# Patient Record
Sex: MALE | Race: WHITE | NOT HISPANIC OR LATINO | URBAN - METROPOLITAN AREA
[De-identification: names, ages, dates, MRNs, and addresses within clinical notes are randomized per-mention and may not be internally consistent; named-entity substitution may affect disease eponyms.]

---

## 2018-03-30 ENCOUNTER — EMERGENCY (EMERGENCY)
Facility: HOSPITAL | Age: 58
LOS: 1 days | Discharge: HOME | End: 2018-03-30
Attending: EMERGENCY MEDICINE | Admitting: EMERGENCY MEDICINE
Payer: COMMERCIAL

## 2018-03-30 VITALS
HEIGHT: 70 IN | HEART RATE: 78 BPM | OXYGEN SATURATION: 95 % | DIASTOLIC BLOOD PRESSURE: 82 MMHG | SYSTOLIC BLOOD PRESSURE: 130 MMHG | WEIGHT: 160.06 LBS | TEMPERATURE: 98 F | RESPIRATION RATE: 14 BRPM

## 2018-03-30 VITALS
OXYGEN SATURATION: 99 % | RESPIRATION RATE: 16 BRPM | TEMPERATURE: 98 F | DIASTOLIC BLOOD PRESSURE: 93 MMHG | HEART RATE: 74 BPM | SYSTOLIC BLOOD PRESSURE: 144 MMHG

## 2018-03-30 LAB
ALBUMIN SERPL ELPH-MCNC: 3.6 G/DL — SIGNIFICANT CHANGE UP (ref 3.3–5)
ALP SERPL-CCNC: 85 U/L — SIGNIFICANT CHANGE UP (ref 30–120)
ALT FLD-CCNC: 26 U/L DA — SIGNIFICANT CHANGE UP (ref 10–60)
AMPHET UR-MCNC: NEGATIVE — SIGNIFICANT CHANGE UP
ANION GAP SERPL CALC-SCNC: 8 MMOL/L — SIGNIFICANT CHANGE UP (ref 5–17)
APPEARANCE UR: CLEAR — SIGNIFICANT CHANGE UP
APTT BLD: 35.7 SEC — SIGNIFICANT CHANGE UP (ref 27.5–37.4)
AST SERPL-CCNC: 29 U/L — SIGNIFICANT CHANGE UP (ref 10–40)
BARBITURATES UR SCN-MCNC: NEGATIVE — SIGNIFICANT CHANGE UP
BASOPHILS # BLD AUTO: 0.1 K/UL — SIGNIFICANT CHANGE UP (ref 0–0.2)
BASOPHILS NFR BLD AUTO: 1.2 % — SIGNIFICANT CHANGE UP (ref 0–2)
BENZODIAZ UR-MCNC: NEGATIVE — SIGNIFICANT CHANGE UP
BILIRUB SERPL-MCNC: 0.7 MG/DL — SIGNIFICANT CHANGE UP (ref 0.2–1.2)
BILIRUB UR-MCNC: NEGATIVE — SIGNIFICANT CHANGE UP
BUN SERPL-MCNC: 14 MG/DL — SIGNIFICANT CHANGE UP (ref 7–23)
CALCIUM SERPL-MCNC: 9.9 MG/DL — SIGNIFICANT CHANGE UP (ref 8.4–10.5)
CHLORIDE SERPL-SCNC: 100 MMOL/L — SIGNIFICANT CHANGE UP (ref 96–108)
CK MB BLD-MCNC: 0.7 % — SIGNIFICANT CHANGE UP (ref 0–3.5)
CK MB CFR SERPL CALC: 1.5 NG/ML — SIGNIFICANT CHANGE UP (ref 0–3.6)
CK SERPL-CCNC: 215 U/L — SIGNIFICANT CHANGE UP (ref 39–308)
CO2 SERPL-SCNC: 30 MMOL/L — SIGNIFICANT CHANGE UP (ref 22–31)
COCAINE METAB.OTHER UR-MCNC: NEGATIVE — SIGNIFICANT CHANGE UP
COLOR SPEC: YELLOW — SIGNIFICANT CHANGE UP
CREAT SERPL-MCNC: 1.04 MG/DL — SIGNIFICANT CHANGE UP (ref 0.5–1.3)
DIFF PNL FLD: NEGATIVE — SIGNIFICANT CHANGE UP
EOSINOPHIL # BLD AUTO: 0.4 K/UL — SIGNIFICANT CHANGE UP (ref 0–0.5)
EOSINOPHIL NFR BLD AUTO: 5.7 % — SIGNIFICANT CHANGE UP (ref 0–6)
GLUCOSE SERPL-MCNC: 96 MG/DL — SIGNIFICANT CHANGE UP (ref 70–99)
GLUCOSE UR QL: NEGATIVE MG/DL — SIGNIFICANT CHANGE UP
HCT VFR BLD CALC: 40.2 % — SIGNIFICANT CHANGE UP (ref 39–50)
HGB BLD-MCNC: 13.8 G/DL — SIGNIFICANT CHANGE UP (ref 13–17)
INR BLD: 1.07 RATIO — SIGNIFICANT CHANGE UP (ref 0.88–1.16)
KETONES UR-MCNC: ABNORMAL
LEUKOCYTE ESTERASE UR-ACNC: NEGATIVE — SIGNIFICANT CHANGE UP
LYMPHOCYTES # BLD AUTO: 1.4 K/UL — SIGNIFICANT CHANGE UP (ref 1–3.3)
LYMPHOCYTES # BLD AUTO: 20.8 % — SIGNIFICANT CHANGE UP (ref 13–44)
MCHC RBC-ENTMCNC: 30.4 PG — SIGNIFICANT CHANGE UP (ref 27–34)
MCHC RBC-ENTMCNC: 34.2 GM/DL — SIGNIFICANT CHANGE UP (ref 32–36)
MCV RBC AUTO: 88.8 FL — SIGNIFICANT CHANGE UP (ref 80–100)
METHADONE UR-MCNC: NEGATIVE — SIGNIFICANT CHANGE UP
MONOCYTES # BLD AUTO: 0.8 K/UL — SIGNIFICANT CHANGE UP (ref 0–0.9)
MONOCYTES NFR BLD AUTO: 12.8 % — SIGNIFICANT CHANGE UP (ref 2–14)
NEUTROPHILS # BLD AUTO: 3.9 K/UL — SIGNIFICANT CHANGE UP (ref 1.8–7.4)
NEUTROPHILS NFR BLD AUTO: 59.5 % — SIGNIFICANT CHANGE UP (ref 43–77)
NITRITE UR-MCNC: NEGATIVE — SIGNIFICANT CHANGE UP
OPIATES UR-MCNC: POSITIVE — SIGNIFICANT CHANGE UP
PCP SPEC-MCNC: SIGNIFICANT CHANGE UP
PCP UR-MCNC: NEGATIVE — SIGNIFICANT CHANGE UP
PH UR: 6 — SIGNIFICANT CHANGE UP (ref 5–8)
PLATELET # BLD AUTO: 186 K/UL — SIGNIFICANT CHANGE UP (ref 150–400)
POTASSIUM SERPL-MCNC: 3.9 MMOL/L — SIGNIFICANT CHANGE UP (ref 3.5–5.3)
POTASSIUM SERPL-SCNC: 3.9 MMOL/L — SIGNIFICANT CHANGE UP (ref 3.5–5.3)
PROT SERPL-MCNC: 7.3 G/DL — SIGNIFICANT CHANGE UP (ref 6–8.3)
PROT UR-MCNC: NEGATIVE MG/DL — SIGNIFICANT CHANGE UP
PROTHROM AB SERPL-ACNC: 11.7 SEC — SIGNIFICANT CHANGE UP (ref 9.8–12.7)
RBC # BLD: 4.53 M/UL — SIGNIFICANT CHANGE UP (ref 4.2–5.8)
RBC # FLD: 10.9 % — SIGNIFICANT CHANGE UP (ref 10.3–14.5)
SODIUM SERPL-SCNC: 138 MMOL/L — SIGNIFICANT CHANGE UP (ref 135–145)
SP GR SPEC: 1.01 — SIGNIFICANT CHANGE UP (ref 1.01–1.02)
THC UR QL: NEGATIVE — SIGNIFICANT CHANGE UP
TROPONIN I SERPL-MCNC: 0 NG/ML — LOW (ref 0.02–0.06)
UROBILINOGEN FLD QL: NEGATIVE MG/DL — SIGNIFICANT CHANGE UP
WBC # BLD: 6.6 K/UL — SIGNIFICANT CHANGE UP (ref 3.8–10.5)
WBC # FLD AUTO: 6.6 K/UL — SIGNIFICANT CHANGE UP (ref 3.8–10.5)

## 2018-03-30 PROCEDURE — 70450 CT HEAD/BRAIN W/O DYE: CPT | Mod: 26

## 2018-03-30 PROCEDURE — 82550 ASSAY OF CK (CPK): CPT

## 2018-03-30 PROCEDURE — 93005 ELECTROCARDIOGRAM TRACING: CPT

## 2018-03-30 PROCEDURE — 93010 ELECTROCARDIOGRAM REPORT: CPT

## 2018-03-30 PROCEDURE — 71045 X-RAY EXAM CHEST 1 VIEW: CPT

## 2018-03-30 PROCEDURE — 84484 ASSAY OF TROPONIN QUANT: CPT

## 2018-03-30 PROCEDURE — 36415 COLL VENOUS BLD VENIPUNCTURE: CPT

## 2018-03-30 PROCEDURE — 85027 COMPLETE CBC AUTOMATED: CPT

## 2018-03-30 PROCEDURE — 85610 PROTHROMBIN TIME: CPT

## 2018-03-30 PROCEDURE — 82962 GLUCOSE BLOOD TEST: CPT

## 2018-03-30 PROCEDURE — 70450 CT HEAD/BRAIN W/O DYE: CPT

## 2018-03-30 PROCEDURE — 80307 DRUG TEST PRSMV CHEM ANLYZR: CPT

## 2018-03-30 PROCEDURE — 71045 X-RAY EXAM CHEST 1 VIEW: CPT | Mod: 26

## 2018-03-30 PROCEDURE — 81003 URINALYSIS AUTO W/O SCOPE: CPT

## 2018-03-30 PROCEDURE — 82553 CREATINE MB FRACTION: CPT

## 2018-03-30 PROCEDURE — 85730 THROMBOPLASTIN TIME PARTIAL: CPT

## 2018-03-30 PROCEDURE — 80053 COMPREHEN METABOLIC PANEL: CPT

## 2018-03-30 PROCEDURE — 99285 EMERGENCY DEPT VISIT HI MDM: CPT

## 2018-03-30 PROCEDURE — 96374 THER/PROPH/DIAG INJ IV PUSH: CPT

## 2018-03-30 PROCEDURE — 99284 EMERGENCY DEPT VISIT MOD MDM: CPT | Mod: 25

## 2018-03-30 RX ORDER — SODIUM CHLORIDE 9 MG/ML
1000 INJECTION INTRAMUSCULAR; INTRAVENOUS; SUBCUTANEOUS
Qty: 0 | Refills: 0 | Status: DISCONTINUED | OUTPATIENT
Start: 2018-03-30 | End: 2018-04-03

## 2018-03-30 RX ORDER — PANTOPRAZOLE SODIUM 20 MG/1
40 TABLET, DELAYED RELEASE ORAL ONCE
Qty: 0 | Refills: 0 | Status: COMPLETED | OUTPATIENT
Start: 2018-03-30 | End: 2018-03-30

## 2018-03-30 RX ADMIN — SODIUM CHLORIDE 100 MILLILITER(S): 9 INJECTION INTRAMUSCULAR; INTRAVENOUS; SUBCUTANEOUS at 12:55

## 2018-03-30 RX ADMIN — PANTOPRAZOLE SODIUM 40 MILLIGRAM(S): 20 TABLET, DELAYED RELEASE ORAL at 15:22

## 2018-03-30 NOTE — ED ADULT NURSE REASSESSMENT NOTE - NS ED NURSE REASSESS COMMENT FT1
Patient is awake and more alert, requested urinal was provided one and voided clear yellow urine. EKG was completed at bedside, awaiting disposition, nursing care ongoing.

## 2018-03-30 NOTE — ED ADULT TRIAGE NOTE - CHIEF COMPLAINT QUOTE
triage and registered via brother pt dexter historian id did not match fathers id   according to brother pr lethargic and has been taking too much pain meds  brother confirmed id and birthdate because pt confused

## 2018-03-30 NOTE — ED PROVIDER NOTE - CONSTITUTIONAL, MLM
normal... Well appearing, well nourished, awake, alert, but lethargic  oriented to person, place, time/situation and in no apparent distress.

## 2018-03-30 NOTE — ED PROVIDER NOTE - MEDICAL DECISION MAKING DETAILS
56 yo male with chronic pain took extra pain meds today now lethargic. Neuro intact. Plan - labs, CT brain. Reassess.

## 2018-03-30 NOTE — ED PROVIDER NOTE - OBJECTIVE STATEMENT
56 y/o M pt hx of chronic pain due to herniated disc in neck takes oxycodone daily prescribe by pain management. brought by ambulance when family notes he was lethargic and out of it. pt admits taking extra oxycodone  today because of worse pain then usual. denies new pain or other symptoms. denies other medicine or hx.   PMD: Karma

## 2018-03-30 NOTE — ED ADULT NURSE NOTE - FAMILY HISTORY
Father  Still living? Unknown  Family history of acute myocardial infarction, Age at diagnosis: Age Unknown     Sibling  Still living? Unknown  Family history of acute myocardial infarction, Age at diagnosis: Age Unknown

## 2018-03-30 NOTE — ED ADULT NURSE REASSESSMENT NOTE - NS ED NURSE REASSESS COMMENT FT1
Patient was discharged, instructions were provided and reviewed, demonstrated understanding, all belongings were taken by patient. Left AAOx4, ambulatory, and accompanied by brother. Iv access was discontinued. Vital signs stable.

## 2018-03-30 NOTE — ED PROVIDER NOTE - CRANIAL NERVE AND PUPILLARY EXAM
extra-ocular movements intact/cranial nerves 2-12 intact/tongue is midline/central vision intact/peripheral vision intact

## 2018-03-30 NOTE — ED ADULT NURSE REASSESSMENT NOTE - NS ED NURSE REASSESS COMMENT FT1
Patient is resting comfortably, no distress is noted, awaiting lab results and disposition, will continue to monitor closely.

## 2018-03-30 NOTE — ED PROVIDER NOTE - PROGRESS NOTE DETAILS
Much more alert. Admits to taking Oxy more than prescribed. Plan - DC and FU with his pain management as discussed. Will consider detox from narcs. Much more alert. Admits to taking Oxy more than prescribed. Plan - DC and FU with his pain management as discussed. Will consider detox from narcs. Brother taking pt home, will encourage [facility] admission tomorrow.

## 2018-03-31 ENCOUNTER — EMERGENCY (EMERGENCY)
Facility: HOSPITAL | Age: 58
LOS: 1 days | Discharge: HOME | End: 2018-03-31
Attending: EMERGENCY MEDICINE | Admitting: EMERGENCY MEDICINE
Payer: COMMERCIAL

## 2018-03-31 VITALS
WEIGHT: 125 LBS | HEART RATE: 93 BPM | SYSTOLIC BLOOD PRESSURE: 134 MMHG | OXYGEN SATURATION: 97 % | DIASTOLIC BLOOD PRESSURE: 88 MMHG | HEIGHT: 64 IN | RESPIRATION RATE: 18 BRPM | TEMPERATURE: 98 F

## 2018-03-31 LAB
ALBUMIN SERPL ELPH-MCNC: 3.1 G/DL — LOW (ref 3.3–5)
ALP SERPL-CCNC: 87 U/L — SIGNIFICANT CHANGE UP (ref 40–120)
ALT FLD-CCNC: 18 U/L — SIGNIFICANT CHANGE UP (ref 12–78)
ANION GAP SERPL CALC-SCNC: 7 MMOL/L — SIGNIFICANT CHANGE UP (ref 5–17)
AST SERPL-CCNC: 20 U/L — SIGNIFICANT CHANGE UP (ref 15–37)
BASOPHILS # BLD AUTO: 0 K/UL — SIGNIFICANT CHANGE UP (ref 0–0.2)
BASOPHILS NFR BLD AUTO: 0.8 % — SIGNIFICANT CHANGE UP (ref 0–2)
BILIRUB SERPL-MCNC: 0.6 MG/DL — SIGNIFICANT CHANGE UP (ref 0.2–1.2)
BUN SERPL-MCNC: 12 MG/DL — SIGNIFICANT CHANGE UP (ref 7–23)
CALCIUM SERPL-MCNC: 8.4 MG/DL — LOW (ref 8.5–10.1)
CHLORIDE SERPL-SCNC: 109 MMOL/L — HIGH (ref 96–108)
CK MB BLD-MCNC: 1.3 % — SIGNIFICANT CHANGE UP (ref 0–3.5)
CK MB CFR SERPL CALC: 1.3 NG/ML — SIGNIFICANT CHANGE UP (ref 0–3.6)
CK SERPL-CCNC: 97 U/L — SIGNIFICANT CHANGE UP (ref 26–308)
CO2 SERPL-SCNC: 26 MMOL/L — SIGNIFICANT CHANGE UP (ref 22–31)
CREAT SERPL-MCNC: 0.91 MG/DL — SIGNIFICANT CHANGE UP (ref 0.5–1.3)
D DIMER BLD IA.RAPID-MCNC: 320 NG/ML DDU — HIGH
EOSINOPHIL # BLD AUTO: 0.1 K/UL — SIGNIFICANT CHANGE UP (ref 0–0.5)
EOSINOPHIL NFR BLD AUTO: 2.3 % — SIGNIFICANT CHANGE UP (ref 0–6)
GLUCOSE SERPL-MCNC: 110 MG/DL — HIGH (ref 70–99)
HCT VFR BLD CALC: 37.6 % — LOW (ref 39–50)
HGB BLD-MCNC: 12.9 G/DL — LOW (ref 13–17)
LACTATE SERPL-SCNC: 0.7 MMOL/L — SIGNIFICANT CHANGE UP (ref 0.7–2)
LIDOCAIN IGE QN: 88 U/L — SIGNIFICANT CHANGE UP (ref 73–393)
LYMPHOCYTES # BLD AUTO: 0.6 K/UL — LOW (ref 1–3.3)
LYMPHOCYTES # BLD AUTO: 12.4 % — LOW (ref 13–44)
MCHC RBC-ENTMCNC: 30.4 PG — SIGNIFICANT CHANGE UP (ref 27–34)
MCHC RBC-ENTMCNC: 34.3 GM/DL — SIGNIFICANT CHANGE UP (ref 32–36)
MCV RBC AUTO: 88.7 FL — SIGNIFICANT CHANGE UP (ref 80–100)
MONOCYTES # BLD AUTO: 0.4 K/UL — SIGNIFICANT CHANGE UP (ref 0–0.9)
MONOCYTES NFR BLD AUTO: 7.3 % — SIGNIFICANT CHANGE UP (ref 1–9)
NEUTROPHILS # BLD AUTO: 4 K/UL — SIGNIFICANT CHANGE UP (ref 1.8–7.4)
NEUTROPHILS NFR BLD AUTO: 77.3 % — HIGH (ref 43–77)
PLATELET # BLD AUTO: 202 K/UL — SIGNIFICANT CHANGE UP (ref 150–400)
POTASSIUM SERPL-MCNC: 3.7 MMOL/L — SIGNIFICANT CHANGE UP (ref 3.5–5.3)
POTASSIUM SERPL-SCNC: 3.7 MMOL/L — SIGNIFICANT CHANGE UP (ref 3.5–5.3)
PROT SERPL-MCNC: 6.5 G/DL — SIGNIFICANT CHANGE UP (ref 6–8.3)
RBC # BLD: 4.24 M/UL — SIGNIFICANT CHANGE UP (ref 4.2–5.8)
RBC # FLD: 11 % — SIGNIFICANT CHANGE UP (ref 10.3–14.5)
SODIUM SERPL-SCNC: 142 MMOL/L — SIGNIFICANT CHANGE UP (ref 135–145)
TROPONIN I SERPL-MCNC: <.015 NG/ML — SIGNIFICANT CHANGE UP (ref 0.01–0.04)
WBC # BLD: 5.1 K/UL — SIGNIFICANT CHANGE UP (ref 3.8–10.5)
WBC # FLD AUTO: 5.1 K/UL — SIGNIFICANT CHANGE UP (ref 3.8–10.5)

## 2018-03-31 PROCEDURE — 85027 COMPLETE CBC AUTOMATED: CPT

## 2018-03-31 PROCEDURE — 99285 EMERGENCY DEPT VISIT HI MDM: CPT

## 2018-03-31 PROCEDURE — 85379 FIBRIN DEGRADATION QUANT: CPT

## 2018-03-31 PROCEDURE — 71275 CT ANGIOGRAPHY CHEST: CPT | Mod: 26

## 2018-03-31 PROCEDURE — 84484 ASSAY OF TROPONIN QUANT: CPT

## 2018-03-31 PROCEDURE — 71275 CT ANGIOGRAPHY CHEST: CPT

## 2018-03-31 PROCEDURE — 82553 CREATINE MB FRACTION: CPT

## 2018-03-31 PROCEDURE — 71045 X-RAY EXAM CHEST 1 VIEW: CPT

## 2018-03-31 PROCEDURE — 71045 X-RAY EXAM CHEST 1 VIEW: CPT | Mod: 26

## 2018-03-31 PROCEDURE — 83690 ASSAY OF LIPASE: CPT

## 2018-03-31 PROCEDURE — 99284 EMERGENCY DEPT VISIT MOD MDM: CPT | Mod: 25

## 2018-03-31 PROCEDURE — 80053 COMPREHEN METABOLIC PANEL: CPT

## 2018-03-31 PROCEDURE — 83605 ASSAY OF LACTIC ACID: CPT

## 2018-03-31 PROCEDURE — 36415 COLL VENOUS BLD VENIPUNCTURE: CPT

## 2018-03-31 PROCEDURE — 82550 ASSAY OF CK (CPK): CPT

## 2018-03-31 PROCEDURE — 70450 CT HEAD/BRAIN W/O DYE: CPT

## 2018-03-31 PROCEDURE — 99284 EMERGENCY DEPT VISIT MOD MDM: CPT

## 2018-03-31 PROCEDURE — 70450 CT HEAD/BRAIN W/O DYE: CPT | Mod: 26

## 2018-03-31 PROCEDURE — 93005 ELECTROCARDIOGRAM TRACING: CPT

## 2018-03-31 PROCEDURE — 96360 HYDRATION IV INFUSION INIT: CPT

## 2018-03-31 RX ORDER — SODIUM CHLORIDE 9 MG/ML
1000 INJECTION INTRAMUSCULAR; INTRAVENOUS; SUBCUTANEOUS ONCE
Qty: 0 | Refills: 0 | Status: COMPLETED | OUTPATIENT
Start: 2018-03-31 | End: 2018-03-31

## 2018-03-31 RX ORDER — MECLIZINE HCL 12.5 MG
25 TABLET ORAL ONCE
Qty: 0 | Refills: 0 | Status: COMPLETED | OUTPATIENT
Start: 2018-03-31 | End: 2018-03-31

## 2018-03-31 RX ORDER — DIAZEPAM 5 MG
5 TABLET ORAL ONCE
Qty: 0 | Refills: 0 | Status: DISCONTINUED | OUTPATIENT
Start: 2018-03-31 | End: 2018-03-31

## 2018-03-31 RX ORDER — SODIUM CHLORIDE 9 MG/ML
3 INJECTION INTRAMUSCULAR; INTRAVENOUS; SUBCUTANEOUS ONCE
Qty: 0 | Refills: 0 | Status: COMPLETED | OUTPATIENT
Start: 2018-03-31 | End: 2018-03-31

## 2018-03-31 RX ADMIN — SODIUM CHLORIDE 1000 MILLILITER(S): 9 INJECTION INTRAMUSCULAR; INTRAVENOUS; SUBCUTANEOUS at 16:51

## 2018-03-31 RX ADMIN — SODIUM CHLORIDE 1000 MILLILITER(S): 9 INJECTION INTRAMUSCULAR; INTRAVENOUS; SUBCUTANEOUS at 16:50

## 2018-03-31 RX ADMIN — SODIUM CHLORIDE 3 MILLILITER(S): 9 INJECTION INTRAMUSCULAR; INTRAVENOUS; SUBCUTANEOUS at 16:12

## 2018-03-31 RX ADMIN — Medication 5 MILLIGRAM(S): at 20:13

## 2018-03-31 RX ADMIN — Medication 25 MILLIGRAM(S): at 16:51

## 2018-03-31 RX ADMIN — Medication 0.1 MILLIGRAM(S): at 17:47

## 2018-03-31 NOTE — ED ADULT NURSE NOTE - OBJECTIVE STATEMENT
patient with complain of weakness and oxycotin withdrawal, discharged from [facility] yesterday. patient report having dizziness today and increased weakness and anxiety. will continue to monitor.

## 2018-03-31 NOTE — ED ADULT TRIAGE NOTE - CHIEF COMPLAINT QUOTE
Patient states "withdrawing from Oxycontin" C/o SOB and increased difficulty concentrating. Patient states he takes more than prescribed and ran out of his prescription.

## 2018-03-31 NOTE — CONSULT NOTE ADULT - SUBJECTIVE AND OBJECTIVE BOX
[facility] - Melvin Johnson, Allison, Marvin, Hu, Na Power  Office Number: [phone]    Initial Consult Note    CHIEF COMPLAINT: Patient is a 57y old  Male who presents with a chief complaint of shortness of breath    HPI:  56 yo M with hx chronic neck pain on pain management. Pt seen at [place] ED yest sp accidental oxycodone overdose. Pt was found lethargic and was taken in. Pt was referred to [facility], declined to go / declined any detox. Today pt co mild dyspnea, slight vertigo feeling. no HA. NO fall / recent trauma. NO numb/ting/focal weak. no abd pain. no vomiting / diarrhea. No visual changes. No agg/allev factors. NO other inj or co.    Mr. Guillen is a 57 M with chronic neck pain on oxycodone, who came in with symptoms of withdrawal to [facility] and was sent home. He reports four episodes of falling down at home after discharge. today, with difficulty catching his breath and chest pain.   He was last seen by our practice in 2016. He had been experiencing atypical chest discomfort for over a year.    He describes this as a moderate waxing and waning discomfort in the center of his chest without radiation and without dyspnea, diaphoresis, palpitations, lightheadedness, or syncope.  A few years ago he had a normal pharmacologic stress nuclear evaluation at [facility] and underwent cardiac catheterization around 2015, which revealed nonobstructive coronary artery disease.   He does not have pain at current time, but thinks he is going into withdrawal.      PAST MEDICAL & SURGICAL HISTORY:  Herniated cervical disc  Anxiety  Radiculopathy: Rt. Arm  Headache, migraine  Back pain  S/P hernia repair: B/L      SOCIAL HISTORY:  No tobacco, ethanol, or drug abuse.    FAMILY HISTORY:  Family history of acute myocardial infarction (Father, Sibling)  brother with bypass surgery and pulmonary hypertension    MEDICATIONS  (STANDING):    MEDICATIONS  (PRN):      Allergies    clams (Stomach Upset)  prednisoLONE (Unknown)    Intolerances        REVIEW OF SYSTEMS:    CONSTITUTIONAL: + weakness, no fevers or chills  EYES/ENT: No visual changes;  No vertigo or throat pain   NECK: No pain or stiffness  RESPIRATORY: No cough, wheezing, hemoptysis; + shortness of breath  CARDIOVASCULAR: +chest pain, no palpitations  GASTROINTESTINAL: No abdominal pain. No nausea, vomiting, or hematemesis; No diarrhea or constipation. No melena or hematochezia.  GENITOURINARY: No dysuria, frequency or hematuria  NEUROLOGICAL: No numbness or weakness  SKIN: No itching or rash  All other review of systems is negative unless indicated above    VITAL SIGNS:   Vital Signs Last 24 Hrs  T(C): 36.9 (31 Mar 2018 15:16), Max: 36.9 (31 Mar 2018 15:16)  T(F): 98.4 (31 Mar 2018 15:16), Max: 98.4 (31 Mar 2018 15:16)  HR: 93 (31 Mar 2018 15:16) (74 - 93)  BP: 134/88 (31 Mar 2018 15:16) (134/88 - 144/93)  BP(mean): --  RR: 18 (31 Mar 2018 15:16) (16 - 18)  SpO2: 97% (31 Mar 2018 15:16) (97% - 99%)    I&O's Summary      On Exam:    Constitutional: NAD, alert and oriented x 3  Lungs:  Non-labored, breath sounds are clear bilaterally, No wheezing, rales or rhonchi  Cardiovascular: RRR.  S1 and S2 positive.  No murmurs, rubs, gallops or clicks  Gastrointestinal: Bowel Sounds present, soft, nontender.   Lymph: No peripheral edema. No cervical lymphadenopathy.  Neurological: Alert, no focal deficits, upper extremity shaking  Skin: No rashes or ulcers   Psych:  Mood & affect appropriate.    LABS: All Labs Reviewed:                        12.9   5.1   )-----------( 202      ( 31 Mar 2018 16:16 )             37.6                         13.8   6.6   )-----------( 186      ( 30 Mar 2018 12:42 )             40.2     31 Mar 2018 16:16    142    |  109    |  12     ----------------------------<  110    3.7     |  26     |  0.91   30 Mar 2018 12:42    138    |  100    |  14     ----------------------------<  96     3.9     |  30     |  1.04     Ca    8.4        31 Mar 2018 16:16  Ca    9.9        30 Mar 2018 12:42    TPro  6.5    /  Alb  3.1    /  TBili  0.6    /  DBili  x      /  AST  20     /  ALT  18     /  AlkPhos  87     31 Mar 2018 16:16  TPro  7.3    /  Alb  3.6    /  TBili  0.7    /  DBili  x      /  AST  29     /  ALT  26     /  AlkPhos  85     30 Mar 2018 12:42    PT/INR - ( 30 Mar 2018 12:42 )   PT: 11.7 sec;   INR: 1.07 ratio         PTT - ( 30 Mar 2018 12:42 )  PTT:35.7 sec  CARDIAC MARKERS ( 31 Mar 2018 16:16 )  <.015 ng/mL / x     / 114 U/L / x     / 1.2 ng/mL  CARDIAC MARKERS ( 30 Mar 2018 15:06 )  .000 ng/mL / x     / 215 U/L / x     / 1.5 ng/mL      Blood Culture:         RADIOLOGY:    EKG: SR, with non-specific T wave inversions laterally.

## 2018-03-31 NOTE — ED ADULT NURSE NOTE - CHPI ED SYMPTOMS NEG
no tingling/no pain/no fever/no chills/no vomiting/no numbness/no nausea/no decreased eating/drinking

## 2018-03-31 NOTE — ED PROVIDER NOTE - ENMT, MLM
Airway patent, Nasal mucosa clear. Mouth with normal mucosa. Throat has no vesicles, no oropharyngeal exudates and uvula is midline. MM Moist. non-toxic, well appearing. Neck supple. no meningeal signs.

## 2018-03-31 NOTE — ED PROVIDER NOTE - PROGRESS NOTE DETAILS
Pt now states he fell 4 times between yest and today. Denies loc. No dizzy / palp.  Pt seen by Dr Monzon, check 2nd set. Pt seen and cleared by Dr Andrew. Pt doing well, no acute co or changes. Pt pending 2nd set CE, will have SW see pt as he is looking for opiate detox patient clinically stable; patient evaluated by  - no inpatient beds available. recommendation is to discharge and patient given information regarding outpatient detox program  denies SI or HI

## 2018-03-31 NOTE — ED PROVIDER NOTE - CHPI ED SYMPTOMS NEG
no chills/no shortness of breath/no nausea/no fever/no diaphoresis/no cough/no syncope/no chest pain/no vomiting

## 2018-03-31 NOTE — CONSULT NOTE ADULT - ASSESSMENT
58 yo M with hx chronic neck pain on oxycodone and recurrent chest pain, presented with difficulty catching his breath.  Yesterday, with possible oxycodone overdose, with multiple falls.  His EKG is a SR with mildly prolonged at 491, with non-specific T wave inversions laterally.  CT chest without PE or pulmonary edema.  No sign of acute decompensated heart failure. 58 yo M with hx chronic neck pain on oxycodone and recurrent chest pain, presented with difficulty catching his breath.  Yesterday, with possible oxycodone overdose, with multiple falls. I think much of his symptoms are related to his oxycodone intake.  His EKG is a SR with mildly prolonged at 491, with non-specific T wave inversions laterally.  CT chest without PE or pulmonary edema.  No sign of acute decompensated heart failure or volume overload.  No sign of acute ischemia. CE are negative. Please send a second set in 4-6 hours.  He has a significant history of CAD in his family, but I do not think his symptoms are primarily cardiac.  Watch creatinine and electrolytes.  Will follow with you if admitted.

## 2018-03-31 NOTE — ED PROVIDER NOTE - OBJECTIVE STATEMENT
58 yo M with hx chronic neck pain on pain management. Pt seen at [place] ED yest sp accidental oxycodone overdose. Pt was found lethargic and was taken in. Pt was referred to [facility], declined to go / declined any detox. Today pt co mild dyspnea, slight vertigo feeling. no HA. NO fall / recent trauma. NO numb/ting/focal weak. no abd pain. no vomiting / diarrhea. No visual changes. No agg/allev factors. NO other inj or co.

## 2018-04-01 VITALS
SYSTOLIC BLOOD PRESSURE: 117 MMHG | RESPIRATION RATE: 16 BRPM | OXYGEN SATURATION: 98 % | DIASTOLIC BLOOD PRESSURE: 70 MMHG | TEMPERATURE: 98 F | HEART RATE: 67 BPM

## 2018-07-20 ENCOUNTER — EMERGENCY (EMERGENCY)
Facility: HOSPITAL | Age: 58
LOS: 1 days | Discharge: HOME | End: 2018-07-20
Attending: EMERGENCY MEDICINE
Payer: COMMERCIAL

## 2018-07-20 VITALS
TEMPERATURE: 99 F | DIASTOLIC BLOOD PRESSURE: 81 MMHG | HEIGHT: 64 IN | SYSTOLIC BLOOD PRESSURE: 111 MMHG | RESPIRATION RATE: 16 BRPM | HEART RATE: 75 BPM | OXYGEN SATURATION: 97 % | WEIGHT: 115.08 LBS

## 2018-07-20 LAB
ALBUMIN SERPL ELPH-MCNC: 3.3 G/DL — SIGNIFICANT CHANGE UP (ref 3.3–5)
ALP SERPL-CCNC: 80 U/L — SIGNIFICANT CHANGE UP (ref 40–120)
ALT FLD-CCNC: 26 U/L — SIGNIFICANT CHANGE UP (ref 12–78)
ANION GAP SERPL CALC-SCNC: 8 MMOL/L — SIGNIFICANT CHANGE UP (ref 5–17)
AST SERPL-CCNC: 26 U/L — SIGNIFICANT CHANGE UP (ref 15–37)
BASOPHILS # BLD AUTO: 0.03 K/UL — SIGNIFICANT CHANGE UP (ref 0–0.2)
BASOPHILS NFR BLD AUTO: 0.6 % — SIGNIFICANT CHANGE UP (ref 0–2)
BILIRUB SERPL-MCNC: 0.5 MG/DL — SIGNIFICANT CHANGE UP (ref 0.2–1.2)
BUN SERPL-MCNC: 15 MG/DL — SIGNIFICANT CHANGE UP (ref 7–23)
CALCIUM SERPL-MCNC: 8.3 MG/DL — LOW (ref 8.5–10.1)
CHLORIDE SERPL-SCNC: 104 MMOL/L — SIGNIFICANT CHANGE UP (ref 96–108)
CO2 SERPL-SCNC: 30 MMOL/L — SIGNIFICANT CHANGE UP (ref 22–31)
CREAT SERPL-MCNC: 1.2 MG/DL — SIGNIFICANT CHANGE UP (ref 0.5–1.3)
EOSINOPHIL # BLD AUTO: 0.44 K/UL — SIGNIFICANT CHANGE UP (ref 0–0.5)
EOSINOPHIL NFR BLD AUTO: 8.4 % — HIGH (ref 0–6)
GLUCOSE SERPL-MCNC: 90 MG/DL — SIGNIFICANT CHANGE UP (ref 70–99)
HCT VFR BLD CALC: 40.1 % — SIGNIFICANT CHANGE UP (ref 39–50)
HGB BLD-MCNC: 13.5 G/DL — SIGNIFICANT CHANGE UP (ref 13–17)
IMM GRANULOCYTES NFR BLD AUTO: 0.2 % — SIGNIFICANT CHANGE UP (ref 0–1.5)
LACTATE SERPL-SCNC: 0.9 MMOL/L — SIGNIFICANT CHANGE UP (ref 0.7–2)
LYMPHOCYTES # BLD AUTO: 0.97 K/UL — LOW (ref 1–3.3)
LYMPHOCYTES # BLD AUTO: 18.5 % — SIGNIFICANT CHANGE UP (ref 13–44)
MCHC RBC-ENTMCNC: 30.3 PG — SIGNIFICANT CHANGE UP (ref 27–34)
MCHC RBC-ENTMCNC: 33.7 GM/DL — SIGNIFICANT CHANGE UP (ref 32–36)
MCV RBC AUTO: 90.1 FL — SIGNIFICANT CHANGE UP (ref 80–100)
MONOCYTES # BLD AUTO: 0.48 K/UL — SIGNIFICANT CHANGE UP (ref 0–0.9)
MONOCYTES NFR BLD AUTO: 9.2 % — SIGNIFICANT CHANGE UP (ref 2–14)
NEUTROPHILS # BLD AUTO: 3.31 K/UL — SIGNIFICANT CHANGE UP (ref 1.8–7.4)
NEUTROPHILS NFR BLD AUTO: 63.1 % — SIGNIFICANT CHANGE UP (ref 43–77)
PLATELET # BLD AUTO: 187 K/UL — SIGNIFICANT CHANGE UP (ref 150–400)
POTASSIUM SERPL-MCNC: 3.8 MMOL/L — SIGNIFICANT CHANGE UP (ref 3.5–5.3)
POTASSIUM SERPL-SCNC: 3.8 MMOL/L — SIGNIFICANT CHANGE UP (ref 3.5–5.3)
PROT SERPL-MCNC: 6.7 G/DL — SIGNIFICANT CHANGE UP (ref 6–8.3)
RBC # BLD: 4.45 M/UL — SIGNIFICANT CHANGE UP (ref 4.2–5.8)
RBC # FLD: 13.1 % — SIGNIFICANT CHANGE UP (ref 10.3–14.5)
SODIUM SERPL-SCNC: 142 MMOL/L — SIGNIFICANT CHANGE UP (ref 135–145)
TROPONIN I SERPL-MCNC: <.015 NG/ML — SIGNIFICANT CHANGE UP (ref 0.01–0.04)
WBC # BLD: 5.24 K/UL — SIGNIFICANT CHANGE UP (ref 3.8–10.5)
WBC # FLD AUTO: 5.24 K/UL — SIGNIFICANT CHANGE UP (ref 3.8–10.5)

## 2018-07-20 PROCEDURE — 99285 EMERGENCY DEPT VISIT HI MDM: CPT

## 2018-07-20 PROCEDURE — 71045 X-RAY EXAM CHEST 1 VIEW: CPT | Mod: 26

## 2018-07-20 PROCEDURE — 70450 CT HEAD/BRAIN W/O DYE: CPT | Mod: 26

## 2018-07-20 NOTE — ED PROVIDER NOTE - MEDICAL DECISION MAKING DETAILS
57y M hx of anxiety, cervical radiculopathy radiating down BL arms, chronic pain on pain mgmt here from home c/o fall, "flailing" extremities and chest pain. Do not think episode is seizure given no altered consciousness, no tongue bite, no loss of bowel or bladder. Will check lactate, CT head. Regarding CP, will check enzymes x2, EKG. Depressed however not suicidal. If results of labs and imaging all WNL, pt may be DC to fu with his PCP and private neurologist.

## 2018-07-20 NOTE — ED ADULT NURSE NOTE - OBJECTIVE STATEMENT
pt reports "falling to the ground in his house and shaking violently"  pt reports having a seizure in March was never seen by neuro or started any seizure meds.  pt did not have an episode of incontinence.  pt states after his episode, pt reports midsternal chest pain radiating down his left arm.  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

## 2018-07-20 NOTE — ED PROVIDER NOTE - PHYSICAL EXAMINATION
Gen: WNWD NAD  HEENT: NCAT PERRL EOMI normal pharynx no tongue lacs   Neck/back: supple no midline TTP   CV: RRR, no murmur  Lung: CTA BL  Abd: +BS soft NTND  Skin: pockmarked scars to BL arms w scattered excoriated lesions to BL neck and arms    Ext: wwp, palp pulses, FROMx4, no cce  Neuro: A&Ox3,CN grossly intact, sensation intact, motor 5/5 throughout, normal gait

## 2018-07-20 NOTE — ED ADULT TRIAGE NOTE - ARRIVAL INFO ADDITIONAL COMMENTS
patient received 4 baby aspirin by ems patient received 4 baby aspirin by ems. patient has an IV #18 on th left antecubital

## 2018-07-20 NOTE — ED ADULT TRIAGE NOTE - CHIEF COMPLAINT QUOTE
passed out 830pm- was standing and fell onto the floor, had ? seizures, and now c/o chest pain radiating to the left arm

## 2018-07-20 NOTE — ED ADULT NURSE NOTE - PSYCHOSOCIAL WDL
Received fax request from [facility] pharmacy requesting refill(s) for Lyrica    Last refilled on 9/6/2017    Pt last seen on 1/10/2018  Next appt scheduled for none scheduled    Will facilitate refill.   Alert and oriented x 3, normal mood and affect, no apparent risk to self or others.

## 2018-07-20 NOTE — ED PROVIDER NOTE - OBJECTIVE STATEMENT
57y M hx of anxiety, cervical radiculopathy radiating down BL arms, chronic pain on pain mgmt here from home c/o fall, "flailing" extremities and chest pain. Pt states that tonight just PTA he went to walk upstairs and he collapsed the floor, with "flailing" of his BL arms and legs which he states was outside of his control. Denies LOC. No tongue bite, no bowel or bladder incont. States hit head when fell to floor. Unsure how long flailing activity lasted for but states he laid on floor for ~15 minutes. States that just as he was about to get up from the floor he experienced a severe sharp pain mid chest radiating down his L arm which also lasted about 15-20 minutes before resolving. No associated sob, diaphoresis, NV. States after episode he called 911. States he lives alone parents  within past year. Reports depressed mood but denies SI or HI. Had been on antidepressants but states pain mgmt took him off because "it was too much for him." Pain mgmt, pcp and neuro all out of [facility]. iSTOP checked ref #[id number]. Rx for oxy 15mg Q6 hours from Dr Lu. Pt denies alcohol, tobacco or illicit drug use.

## 2018-07-20 NOTE — ED ADULT NURSE NOTE - CHPI ED SYMPTOMS NEG
no dizziness/no fever/no loss of consciousness/no blurred vision/no nausea/no confusion/no change in level of consciousness

## 2018-07-21 VITALS
OXYGEN SATURATION: 95 % | TEMPERATURE: 98 F | RESPIRATION RATE: 16 BRPM | SYSTOLIC BLOOD PRESSURE: 128 MMHG | DIASTOLIC BLOOD PRESSURE: 68 MMHG | HEART RATE: 68 BPM

## 2018-07-21 LAB — TROPONIN I SERPL-MCNC: <.015 NG/ML — SIGNIFICANT CHANGE UP (ref 0.01–0.04)

## 2018-07-21 PROCEDURE — 80053 COMPREHEN METABOLIC PANEL: CPT

## 2018-07-21 PROCEDURE — 70450 CT HEAD/BRAIN W/O DYE: CPT

## 2018-07-21 PROCEDURE — 99284 EMERGENCY DEPT VISIT MOD MDM: CPT | Mod: 25

## 2018-07-21 PROCEDURE — 85027 COMPLETE CBC AUTOMATED: CPT

## 2018-07-21 PROCEDURE — 80307 DRUG TEST PRSMV CHEM ANLYZR: CPT

## 2018-07-21 PROCEDURE — 71045 X-RAY EXAM CHEST 1 VIEW: CPT

## 2018-07-21 PROCEDURE — 84484 ASSAY OF TROPONIN QUANT: CPT

## 2018-07-21 PROCEDURE — 83605 ASSAY OF LACTIC ACID: CPT

## 2018-07-29 LAB
AMPHET UR-MCNC: NEGATIVE — SIGNIFICANT CHANGE UP
BARBITURATES, URINE.: NEGATIVE — SIGNIFICANT CHANGE UP
BENZODIAZ UR-MCNC: NEGATIVE — SIGNIFICANT CHANGE UP
COCAINE METAB.OTHER UR-MCNC: NEGATIVE — SIGNIFICANT CHANGE UP
CREATININE, URINE THERAPEUTIC: 170.2 MG/DL — SIGNIFICANT CHANGE UP
METHADONE UR-MCNC: NEGATIVE — SIGNIFICANT CHANGE UP
METHAQUALONE UR QL: NEGATIVE — SIGNIFICANT CHANGE UP
METHAQUALONE UR-MCNC: NEGATIVE — SIGNIFICANT CHANGE UP
OPIATES UR-MCNC: NEGATIVE — SIGNIFICANT CHANGE UP
PCP UR-MCNC: NEGATIVE — SIGNIFICANT CHANGE UP
PROPOXYPH UR QL: NEGATIVE — SIGNIFICANT CHANGE UP
THC UR QL: NEGATIVE — SIGNIFICANT CHANGE UP

## 2018-08-11 ENCOUNTER — EMERGENCY (EMERGENCY)
Facility: HOSPITAL | Age: 58
LOS: 1 days | Discharge: HOME | End: 2018-08-11
Attending: EMERGENCY MEDICINE | Admitting: EMERGENCY MEDICINE
Payer: COMMERCIAL

## 2018-08-11 VITALS
HEIGHT: 70 IN | OXYGEN SATURATION: 97 % | SYSTOLIC BLOOD PRESSURE: 120 MMHG | TEMPERATURE: 99 F | DIASTOLIC BLOOD PRESSURE: 76 MMHG | HEART RATE: 91 BPM | RESPIRATION RATE: 16 BRPM | WEIGHT: 110.01 LBS

## 2018-08-11 LAB
ALBUMIN SERPL ELPH-MCNC: 3.8 G/DL — SIGNIFICANT CHANGE UP (ref 3.3–5)
ALP SERPL-CCNC: 76 U/L — SIGNIFICANT CHANGE UP (ref 30–120)
ALT FLD-CCNC: 41 U/L DA — SIGNIFICANT CHANGE UP (ref 10–60)
AMMONIA BLD-MCNC: <10 UMOL/L — LOW (ref 11–32)
ANION GAP SERPL CALC-SCNC: 9 MMOL/L — SIGNIFICANT CHANGE UP (ref 5–17)
APAP SERPL-MCNC: <1 — SIGNIFICANT CHANGE UP (ref 10–30)
AST SERPL-CCNC: 50 U/L — HIGH (ref 10–40)
BASOPHILS # BLD AUTO: 0.05 K/UL — SIGNIFICANT CHANGE UP (ref 0–0.2)
BASOPHILS NFR BLD AUTO: 0.7 % — SIGNIFICANT CHANGE UP (ref 0–2)
BILIRUB SERPL-MCNC: 1.4 MG/DL — HIGH (ref 0.2–1.2)
BUN SERPL-MCNC: 26 MG/DL — HIGH (ref 7–23)
CALCIUM SERPL-MCNC: 9.8 MG/DL — SIGNIFICANT CHANGE UP (ref 8.4–10.5)
CHLORIDE SERPL-SCNC: 99 MMOL/L — SIGNIFICANT CHANGE UP (ref 96–108)
CO2 SERPL-SCNC: 28 MMOL/L — SIGNIFICANT CHANGE UP (ref 22–31)
CREAT SERPL-MCNC: 1.44 MG/DL — HIGH (ref 0.5–1.3)
EOSINOPHIL # BLD AUTO: 0.09 K/UL — SIGNIFICANT CHANGE UP (ref 0–0.5)
EOSINOPHIL NFR BLD AUTO: 1.2 % — SIGNIFICANT CHANGE UP (ref 0–6)
ETHANOL SERPL-MCNC: <3 MG/DL — SIGNIFICANT CHANGE UP (ref 0–3)
GLUCOSE SERPL-MCNC: 126 MG/DL — HIGH (ref 70–99)
HCT VFR BLD CALC: 38.5 % — LOW (ref 39–50)
HGB BLD-MCNC: 12.9 G/DL — LOW (ref 13–17)
IMM GRANULOCYTES NFR BLD AUTO: 0.1 % — SIGNIFICANT CHANGE UP (ref 0–1.5)
LACTATE SERPL-SCNC: 1.9 MMOL/L — SIGNIFICANT CHANGE UP (ref 0.7–2)
LYMPHOCYTES # BLD AUTO: 1.1 K/UL — SIGNIFICANT CHANGE UP (ref 1–3.3)
LYMPHOCYTES # BLD AUTO: 14.6 % — SIGNIFICANT CHANGE UP (ref 13–44)
MCHC RBC-ENTMCNC: 30.4 PG — SIGNIFICANT CHANGE UP (ref 27–34)
MCHC RBC-ENTMCNC: 33.5 GM/DL — SIGNIFICANT CHANGE UP (ref 32–36)
MCV RBC AUTO: 90.8 FL — SIGNIFICANT CHANGE UP (ref 80–100)
MONOCYTES # BLD AUTO: 0.88 K/UL — SIGNIFICANT CHANGE UP (ref 0–0.9)
MONOCYTES NFR BLD AUTO: 11.7 % — SIGNIFICANT CHANGE UP (ref 2–14)
NEUTROPHILS # BLD AUTO: 5.38 K/UL — SIGNIFICANT CHANGE UP (ref 1.8–7.4)
NEUTROPHILS NFR BLD AUTO: 71.7 % — SIGNIFICANT CHANGE UP (ref 43–77)
NRBC # BLD: 0 /100 WBCS — SIGNIFICANT CHANGE UP (ref 0–0)
PLATELET # BLD AUTO: 186 K/UL — SIGNIFICANT CHANGE UP (ref 150–400)
POTASSIUM SERPL-MCNC: 3.9 MMOL/L — SIGNIFICANT CHANGE UP (ref 3.5–5.3)
POTASSIUM SERPL-SCNC: 3.9 MMOL/L — SIGNIFICANT CHANGE UP (ref 3.5–5.3)
PROT SERPL-MCNC: 7.3 G/DL — SIGNIFICANT CHANGE UP (ref 6–8.3)
RBC # BLD: 4.24 M/UL — SIGNIFICANT CHANGE UP (ref 4.2–5.8)
RBC # FLD: 12.6 % — SIGNIFICANT CHANGE UP (ref 10.3–14.5)
SALICYLATES SERPL-MCNC: 1.5 MG/DL — LOW (ref 2.8–20)
SODIUM SERPL-SCNC: 136 MMOL/L — SIGNIFICANT CHANGE UP (ref 135–145)
WBC # BLD: 7.51 K/UL — SIGNIFICANT CHANGE UP (ref 3.8–10.5)
WBC # FLD AUTO: 7.51 K/UL — SIGNIFICANT CHANGE UP (ref 3.8–10.5)

## 2018-08-11 PROCEDURE — 70450 CT HEAD/BRAIN W/O DYE: CPT | Mod: 26

## 2018-08-11 PROCEDURE — 99285 EMERGENCY DEPT VISIT HI MDM: CPT

## 2018-08-11 PROCEDURE — 93010 ELECTROCARDIOGRAM REPORT: CPT

## 2018-08-11 PROCEDURE — 71045 X-RAY EXAM CHEST 1 VIEW: CPT | Mod: 26

## 2018-08-11 NOTE — ED ADULT NURSE NOTE - PMH
Anxiety    Back pain    Headache, migraine    Herniated cervical disc    Radiculopathy  Rt. Arm Anxiety    Back pain    Headache, migraine    Herniated cervical disc    Hypothyroid    Radiculopathy  Rt. Arm
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
## 2018-08-11 NOTE — ED PROVIDER NOTE - PSYCHIATRIC, MLM
Demeanor changed by the end of the exam, able to remember more about present events, willing to provide information and improved eye contact. no apparent risk to self or others.

## 2018-08-11 NOTE — ED PROVIDER NOTE - PROGRESS NOTE DETAILS
pt improved recalls all events now, no specific etiology for confusion noted await ua pt fast asleep, normal readings on cardiac monitor no distress, will awaken to get ua pt ambulated to bathroom and back no difficulties feels fine

## 2018-08-11 NOTE — ED PROVIDER NOTE - CONSTITUTIONAL, MLM
normal... Well groomed, soft spoken with flat effect and makes poor eye contact. Pt is awake, alert, oriented to person, place, time/situation and in no apparent distress.

## 2018-08-11 NOTE — ED ADULT NURSE NOTE - NSIMPLEMENTINTERV_GEN_ALL_ED
Implemented All Fall with Harm Risk Interventions:  [place] to call system. Call bell, personal items and telephone within reach. Instruct patient to call for assistance. Room bathroom lighting operational. Non-slip footwear when patient is off stretcher. Physically safe environment: no spills, clutter or unnecessary equipment. Stretcher in lowest position, wheels locked, appropriate side rails in place. Provide visual cue, wrist band, yellow gown, etc. Monitor gait and stability. Monitor for mental status changes and reorient to person, place, and time. Review medications for side effects contributing to fall risk. Reinforce activity limits and safety measures with patient and family. Provide visual clues: red socks.
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
## 2018-08-12 VITALS
SYSTOLIC BLOOD PRESSURE: 130 MMHG | HEART RATE: 70 BPM | RESPIRATION RATE: 16 BRPM | OXYGEN SATURATION: 98 % | DIASTOLIC BLOOD PRESSURE: 70 MMHG | TEMPERATURE: 98 F

## 2018-08-12 LAB
AMPHET UR-MCNC: NEGATIVE — SIGNIFICANT CHANGE UP
APPEARANCE UR: CLEAR — SIGNIFICANT CHANGE UP
BARBITURATES UR SCN-MCNC: NEGATIVE — SIGNIFICANT CHANGE UP
BENZODIAZ UR-MCNC: NEGATIVE — SIGNIFICANT CHANGE UP
BILIRUB UR-MCNC: NEGATIVE — SIGNIFICANT CHANGE UP
COCAINE METAB.OTHER UR-MCNC: NEGATIVE — SIGNIFICANT CHANGE UP
COLOR SPEC: YELLOW — SIGNIFICANT CHANGE UP
DIFF PNL FLD: NEGATIVE — SIGNIFICANT CHANGE UP
GLUCOSE UR QL: NEGATIVE MG/DL — SIGNIFICANT CHANGE UP
KETONES UR-MCNC: ABNORMAL
LEUKOCYTE ESTERASE UR-ACNC: ABNORMAL
METHADONE UR-MCNC: NEGATIVE — SIGNIFICANT CHANGE UP
NITRITE UR-MCNC: NEGATIVE — SIGNIFICANT CHANGE UP
OPIATES UR-MCNC: POSITIVE — SIGNIFICANT CHANGE UP
PCP SPEC-MCNC: SIGNIFICANT CHANGE UP
PCP UR-MCNC: NEGATIVE — SIGNIFICANT CHANGE UP
PH UR: 6 — SIGNIFICANT CHANGE UP (ref 5–8)
PROT UR-MCNC: 30 MG/DL
SP GR SPEC: 1.02 — SIGNIFICANT CHANGE UP (ref 1.01–1.02)
THC UR QL: NEGATIVE — SIGNIFICANT CHANGE UP
UROBILINOGEN FLD QL: NEGATIVE MG/DL — SIGNIFICANT CHANGE UP
WBC UR QL: SIGNIFICANT CHANGE UP

## 2018-08-12 PROCEDURE — 93005 ELECTROCARDIOGRAM TRACING: CPT

## 2018-08-12 PROCEDURE — 85027 COMPLETE CBC AUTOMATED: CPT

## 2018-08-12 PROCEDURE — 70450 CT HEAD/BRAIN W/O DYE: CPT

## 2018-08-12 PROCEDURE — 87040 BLOOD CULTURE FOR BACTERIA: CPT

## 2018-08-12 PROCEDURE — 83605 ASSAY OF LACTIC ACID: CPT

## 2018-08-12 PROCEDURE — 71045 X-RAY EXAM CHEST 1 VIEW: CPT

## 2018-08-12 PROCEDURE — 99284 EMERGENCY DEPT VISIT MOD MDM: CPT | Mod: 25

## 2018-08-12 PROCEDURE — 82140 ASSAY OF AMMONIA: CPT

## 2018-08-12 PROCEDURE — 80053 COMPREHEN METABOLIC PANEL: CPT

## 2018-08-12 PROCEDURE — 81001 URINALYSIS AUTO W/SCOPE: CPT

## 2018-08-12 PROCEDURE — 82962 GLUCOSE BLOOD TEST: CPT

## 2018-08-12 PROCEDURE — 87086 URINE CULTURE/COLONY COUNT: CPT

## 2018-08-12 PROCEDURE — 80307 DRUG TEST PRSMV CHEM ANLYZR: CPT

## 2018-08-12 RX ORDER — SODIUM CHLORIDE 9 MG/ML
1000 INJECTION INTRAMUSCULAR; INTRAVENOUS; SUBCUTANEOUS ONCE
Qty: 0 | Refills: 0 | Status: COMPLETED | OUTPATIENT
Start: 2018-08-12 | End: 2018-08-12

## 2018-08-12 RX ADMIN — SODIUM CHLORIDE 1000 MILLILITER(S): 9 INJECTION INTRAMUSCULAR; INTRAVENOUS; SUBCUTANEOUS at 01:00

## 2018-08-12 RX ADMIN — SODIUM CHLORIDE 1000 MILLILITER(S): 9 INJECTION INTRAMUSCULAR; INTRAVENOUS; SUBCUTANEOUS at 00:00

## 2018-08-13 LAB
CULTURE RESULTS: NO GROWTH — SIGNIFICANT CHANGE UP
SPECIMEN SOURCE: SIGNIFICANT CHANGE UP

## 2018-08-17 LAB
CULTURE RESULTS: SIGNIFICANT CHANGE UP
CULTURE RESULTS: SIGNIFICANT CHANGE UP
SPECIMEN SOURCE: SIGNIFICANT CHANGE UP
SPECIMEN SOURCE: SIGNIFICANT CHANGE UP

## 2018-08-20 ENCOUNTER — EMERGENCY (EMERGENCY)
Facility: HOSPITAL | Age: 58
LOS: 1 days | Discharge: HOME | End: 2018-08-20
Attending: EMERGENCY MEDICINE
Payer: COMMERCIAL

## 2018-08-20 VITALS
HEART RATE: 81 BPM | RESPIRATION RATE: 15 BRPM | DIASTOLIC BLOOD PRESSURE: 87 MMHG | TEMPERATURE: 98 F | OXYGEN SATURATION: 98 % | HEIGHT: 64 IN | SYSTOLIC BLOOD PRESSURE: 138 MMHG | WEIGHT: 110.01 LBS

## 2018-08-20 PROCEDURE — 99282 EMERGENCY DEPT VISIT SF MDM: CPT

## 2018-08-20 PROCEDURE — 99283 EMERGENCY DEPT VISIT LOW MDM: CPT

## 2018-08-20 RX ORDER — HYDROXYZINE HCL 10 MG
1 TABLET ORAL
Qty: 0 | Refills: 0 | COMMUNITY

## 2018-08-20 RX ORDER — GABAPENTIN 400 MG/1
1 CAPSULE ORAL
Qty: 0 | Refills: 0 | COMMUNITY

## 2018-08-20 RX ORDER — ACETAMINOPHEN 500 MG
650 TABLET ORAL ONCE
Qty: 0 | Refills: 0 | Status: COMPLETED | OUTPATIENT
Start: 2018-08-20 | End: 2018-08-20

## 2018-08-20 RX ORDER — DOXEPIN HCL 100 MG
1 CAPSULE ORAL
Qty: 0 | Refills: 0 | COMMUNITY

## 2018-08-20 RX ORDER — RABEPRAZOLE 20 MG/1
0 TABLET, DELAYED RELEASE ORAL
Qty: 0 | Refills: 0 | COMMUNITY

## 2018-08-20 RX ORDER — MUPIROCIN 20 MG/G
1 OINTMENT TOPICAL
Qty: 15 | Refills: 0 | OUTPATIENT
Start: 2018-08-20 | End: 2018-08-29

## 2018-08-20 RX ORDER — MUPIROCIN 20 MG/G
1 OINTMENT TOPICAL ONCE
Qty: 0 | Refills: 0 | Status: COMPLETED | OUTPATIENT
Start: 2018-08-20 | End: 2018-08-20

## 2018-08-20 RX ADMIN — Medication 650 MILLIGRAM(S): at 19:42

## 2018-08-20 RX ADMIN — Medication 650 MILLIGRAM(S): at 19:48

## 2018-08-20 RX ADMIN — MUPIROCIN 1 APPLICATION(S): 20 OINTMENT TOPICAL at 19:46

## 2018-08-20 NOTE — ED ADULT NURSE NOTE - OBJECTIVE STATEMENT
Patient complaining of coccyx pain stated he fell 8-9 days ago noted with abrasion to coccyx area seen and evaluated by MD.

## 2018-08-20 NOTE — ED ADULT NURSE NOTE - PMH
Anxiety    Back pain    Headache, migraine    Herniated cervical disc    Hypothyroid    Radiculopathy  Rt. Arm

## 2018-08-20 NOTE — ED PROVIDER NOTE - PROGRESS NOTE DETAILS
1 tube of bactroban given  advised apply tid x 10 days, states he will follow up with his dermatologist Dr Monroy, advised call tomorrow to arrange follow up
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
## 2019-01-26 ENCOUNTER — INPATIENT (INPATIENT)
Facility: HOSPITAL | Age: 59
LOS: 2 days | Discharge: HOME | DRG: 918 | End: 2019-01-29
Attending: INTERNAL MEDICINE | Admitting: INTERNAL MEDICINE
Payer: COMMERCIAL

## 2019-01-26 VITALS — HEART RATE: 92 BPM | SYSTOLIC BLOOD PRESSURE: 93 MMHG | OXYGEN SATURATION: 92 % | DIASTOLIC BLOOD PRESSURE: 64 MMHG

## 2019-01-26 DIAGNOSIS — Z29.9 ENCOUNTER FOR PROPHYLACTIC MEASURES, UNSPECIFIED: ICD-10-CM

## 2019-01-26 DIAGNOSIS — M50.20 OTHER CERVICAL DISC DISPLACEMENT, UNSPECIFIED CERVICAL REGION: ICD-10-CM

## 2019-01-26 DIAGNOSIS — K21.9 GASTRO-ESOPHAGEAL REFLUX DISEASE WITHOUT ESOPHAGITIS: ICD-10-CM

## 2019-01-26 DIAGNOSIS — G43.909 MIGRAINE, UNSPECIFIED, NOT INTRACTABLE, WITHOUT STATUS MIGRAINOSUS: ICD-10-CM

## 2019-01-26 DIAGNOSIS — G47.00 INSOMNIA, UNSPECIFIED: ICD-10-CM

## 2019-01-26 DIAGNOSIS — R41.82 ALTERED MENTAL STATUS, UNSPECIFIED: ICD-10-CM

## 2019-01-26 DIAGNOSIS — F41.9 ANXIETY DISORDER, UNSPECIFIED: ICD-10-CM

## 2019-01-26 DIAGNOSIS — R55 SYNCOPE AND COLLAPSE: ICD-10-CM

## 2019-01-26 LAB
ALBUMIN SERPL ELPH-MCNC: 3.4 G/DL — SIGNIFICANT CHANGE UP (ref 3.3–5)
ALBUMIN SERPL ELPH-MCNC: 3.8 G/DL — SIGNIFICANT CHANGE UP (ref 3.3–5)
ALP SERPL-CCNC: 117 U/L — SIGNIFICANT CHANGE UP (ref 40–120)
ALP SERPL-CCNC: 151 U/L — HIGH (ref 40–120)
ALT FLD-CCNC: 41 U/L — SIGNIFICANT CHANGE UP (ref 12–78)
ALT FLD-CCNC: 52 U/L — SIGNIFICANT CHANGE UP (ref 12–78)
ANION GAP SERPL CALC-SCNC: 10 MMOL/L — SIGNIFICANT CHANGE UP (ref 5–17)
ANION GAP SERPL CALC-SCNC: 7 MMOL/L — SIGNIFICANT CHANGE UP (ref 5–17)
APAP SERPL-MCNC: <2 UG/ML — LOW (ref 10–30)
AST SERPL-CCNC: 143 U/L — HIGH (ref 15–37)
AST SERPL-CCNC: 38 U/L — HIGH (ref 15–37)
BILIRUB SERPL-MCNC: 1.1 MG/DL — SIGNIFICANT CHANGE UP (ref 0.2–1.2)
BILIRUB SERPL-MCNC: 1.3 MG/DL — HIGH (ref 0.2–1.2)
BUN SERPL-MCNC: 29 MG/DL — HIGH (ref 7–23)
BUN SERPL-MCNC: 32 MG/DL — HIGH (ref 7–23)
CALCIUM SERPL-MCNC: 8.7 MG/DL — SIGNIFICANT CHANGE UP (ref 8.5–10.1)
CALCIUM SERPL-MCNC: 8.9 MG/DL — SIGNIFICANT CHANGE UP (ref 8.5–10.1)
CHLORIDE SERPL-SCNC: 102 MMOL/L — SIGNIFICANT CHANGE UP (ref 96–108)
CHLORIDE SERPL-SCNC: 106 MMOL/L — SIGNIFICANT CHANGE UP (ref 96–108)
CO2 SERPL-SCNC: 26 MMOL/L — SIGNIFICANT CHANGE UP (ref 22–31)
CO2 SERPL-SCNC: 28 MMOL/L — SIGNIFICANT CHANGE UP (ref 22–31)
CREAT SERPL-MCNC: 1.2 MG/DL — SIGNIFICANT CHANGE UP (ref 0.5–1.3)
CREAT SERPL-MCNC: 1.3 MG/DL — SIGNIFICANT CHANGE UP (ref 0.5–1.3)
ETHANOL SERPL-MCNC: <10 MG/DL — SIGNIFICANT CHANGE UP (ref 0–10)
GLUCOSE SERPL-MCNC: 113 MG/DL — HIGH (ref 70–99)
GLUCOSE SERPL-MCNC: 124 MG/DL — HIGH (ref 70–99)
HCT VFR BLD CALC: 44.8 % — SIGNIFICANT CHANGE UP (ref 39–50)
HCT VFR BLD CALC: 45 % — SIGNIFICANT CHANGE UP (ref 39–50)
HCV AB S/CO SERPL IA: 0.47 S/CO — SIGNIFICANT CHANGE UP
HCV AB SERPL-IMP: SIGNIFICANT CHANGE UP
HGB BLD-MCNC: 14.9 G/DL — SIGNIFICANT CHANGE UP (ref 13–17)
HGB BLD-MCNC: 15.3 G/DL — SIGNIFICANT CHANGE UP (ref 13–17)
MCHC RBC-ENTMCNC: 30.4 PG — SIGNIFICANT CHANGE UP (ref 27–34)
MCHC RBC-ENTMCNC: 30.5 PG — SIGNIFICANT CHANGE UP (ref 27–34)
MCHC RBC-ENTMCNC: 33.1 GM/DL — SIGNIFICANT CHANGE UP (ref 32–36)
MCHC RBC-ENTMCNC: 34.2 GM/DL — SIGNIFICANT CHANGE UP (ref 32–36)
MCV RBC AUTO: 89.1 FL — SIGNIFICANT CHANGE UP (ref 80–100)
MCV RBC AUTO: 92.2 FL — SIGNIFICANT CHANGE UP (ref 80–100)
NRBC # BLD: 0 /100 WBCS — SIGNIFICANT CHANGE UP (ref 0–0)
NRBC # BLD: 0 /100 WBCS — SIGNIFICANT CHANGE UP (ref 0–0)
PLATELET # BLD AUTO: 169 K/UL — SIGNIFICANT CHANGE UP (ref 150–400)
PLATELET # BLD AUTO: 181 K/UL — SIGNIFICANT CHANGE UP (ref 150–400)
POTASSIUM SERPL-MCNC: 4.4 MMOL/L — SIGNIFICANT CHANGE UP (ref 3.5–5.3)
POTASSIUM SERPL-MCNC: 4.8 MMOL/L — SIGNIFICANT CHANGE UP (ref 3.5–5.3)
POTASSIUM SERPL-SCNC: 4.4 MMOL/L — SIGNIFICANT CHANGE UP (ref 3.5–5.3)
POTASSIUM SERPL-SCNC: 4.8 MMOL/L — SIGNIFICANT CHANGE UP (ref 3.5–5.3)
PROT SERPL-MCNC: 6.9 G/DL — SIGNIFICANT CHANGE UP (ref 6–8.3)
PROT SERPL-MCNC: 7.4 G/DL — SIGNIFICANT CHANGE UP (ref 6–8.3)
RBC # BLD: 4.88 M/UL — SIGNIFICANT CHANGE UP (ref 4.2–5.8)
RBC # BLD: 5.03 M/UL — SIGNIFICANT CHANGE UP (ref 4.2–5.8)
RBC # FLD: 12.7 % — SIGNIFICANT CHANGE UP (ref 10.3–14.5)
RBC # FLD: 12.9 % — SIGNIFICANT CHANGE UP (ref 10.3–14.5)
SALICYLATES SERPL-MCNC: <1.7 MG/DL — LOW (ref 2.8–20)
SODIUM SERPL-SCNC: 137 MMOL/L — SIGNIFICANT CHANGE UP (ref 135–145)
SODIUM SERPL-SCNC: 142 MMOL/L — SIGNIFICANT CHANGE UP (ref 135–145)
TROPONIN I SERPL-MCNC: <.015 NG/ML — SIGNIFICANT CHANGE UP (ref 0.01–0.04)
WBC # BLD: 11.52 K/UL — HIGH (ref 3.8–10.5)
WBC # BLD: 7.81 K/UL — SIGNIFICANT CHANGE UP (ref 3.8–10.5)
WBC # FLD AUTO: 11.52 K/UL — HIGH (ref 3.8–10.5)
WBC # FLD AUTO: 7.81 K/UL — SIGNIFICANT CHANGE UP (ref 3.8–10.5)

## 2019-01-26 PROCEDURE — 70450 CT HEAD/BRAIN W/O DYE: CPT | Mod: 26

## 2019-01-26 PROCEDURE — 99285 EMERGENCY DEPT VISIT HI MDM: CPT

## 2019-01-26 PROCEDURE — 72040 X-RAY EXAM NECK SPINE 2-3 VW: CPT | Mod: 26

## 2019-01-26 PROCEDURE — 71045 X-RAY EXAM CHEST 1 VIEW: CPT | Mod: 26

## 2019-01-26 PROCEDURE — 93010 ELECTROCARDIOGRAM REPORT: CPT

## 2019-01-26 RX ORDER — PANTOPRAZOLE SODIUM 20 MG/1
40 TABLET, DELAYED RELEASE ORAL
Qty: 0 | Refills: 0 | Status: DISCONTINUED | OUTPATIENT
Start: 2019-01-26 | End: 2019-01-29

## 2019-01-26 RX ORDER — ENOXAPARIN SODIUM 100 MG/ML
40 INJECTION SUBCUTANEOUS EVERY 24 HOURS
Qty: 0 | Refills: 0 | Status: DISCONTINUED | OUTPATIENT
Start: 2019-01-26 | End: 2019-01-29

## 2019-01-26 RX ORDER — ONDANSETRON 8 MG/1
0 TABLET, FILM COATED ORAL
Qty: 0 | Refills: 0 | COMMUNITY

## 2019-01-26 RX ORDER — ONDANSETRON 8 MG/1
4 TABLET, FILM COATED ORAL THREE TIMES A DAY
Qty: 0 | Refills: 0 | Status: DISCONTINUED | OUTPATIENT
Start: 2019-01-26 | End: 2019-01-29

## 2019-01-26 RX ORDER — GABAPENTIN 400 MG/1
0 CAPSULE ORAL
Qty: 0 | Refills: 0 | COMMUNITY

## 2019-01-26 RX ORDER — OXYCODONE HYDROCHLORIDE 5 MG/1
0 TABLET ORAL
Qty: 0 | Refills: 0 | COMMUNITY

## 2019-01-26 RX ORDER — SODIUM CHLORIDE 9 MG/ML
1000 INJECTION INTRAMUSCULAR; INTRAVENOUS; SUBCUTANEOUS
Qty: 0 | Refills: 0 | Status: DISCONTINUED | OUTPATIENT
Start: 2019-01-26 | End: 2019-01-28

## 2019-01-26 RX ORDER — HYDROXYZINE HCL 10 MG
0 TABLET ORAL
Qty: 0 | Refills: 0 | COMMUNITY

## 2019-01-26 RX ORDER — METOPROLOL TARTRATE 50 MG
0 TABLET ORAL
Qty: 0 | Refills: 0 | COMMUNITY

## 2019-01-26 RX ORDER — ACETAMINOPHEN 500 MG
650 TABLET ORAL EVERY 6 HOURS
Qty: 0 | Refills: 0 | Status: DISCONTINUED | OUTPATIENT
Start: 2019-01-26 | End: 2019-01-29

## 2019-01-26 RX ORDER — OXYCODONE AND ACETAMINOPHEN 5; 325 MG/1; MG/1
1 TABLET ORAL EVERY 4 HOURS
Qty: 0 | Refills: 0 | Status: DISCONTINUED | OUTPATIENT
Start: 2019-01-26 | End: 2019-01-29

## 2019-01-26 RX ORDER — GABAPENTIN 400 MG/1
400 CAPSULE ORAL THREE TIMES A DAY
Qty: 0 | Refills: 0 | Status: DISCONTINUED | OUTPATIENT
Start: 2019-01-26 | End: 2019-01-29

## 2019-01-26 RX ORDER — HYDROXYZINE HCL 10 MG
25 TABLET ORAL AT BEDTIME
Qty: 0 | Refills: 0 | Status: DISCONTINUED | OUTPATIENT
Start: 2019-01-26 | End: 2019-01-29

## 2019-01-26 RX ORDER — DOXEPIN HCL 100 MG
0 CAPSULE ORAL
Qty: 0 | Refills: 0 | COMMUNITY

## 2019-01-26 RX ORDER — NALOXONE HYDROCHLORIDE 4 MG/.1ML
0.4 SPRAY NASAL ONCE
Qty: 0 | Refills: 0 | Status: COMPLETED | OUTPATIENT
Start: 2019-01-26 | End: 2019-01-26

## 2019-01-26 RX ORDER — METOPROLOL TARTRATE 50 MG
50 TABLET ORAL THREE TIMES A DAY
Qty: 0 | Refills: 0 | Status: DISCONTINUED | OUTPATIENT
Start: 2019-01-26 | End: 2019-01-29

## 2019-01-26 RX ORDER — RABEPRAZOLE 20 MG/1
0 TABLET, DELAYED RELEASE ORAL
Qty: 0 | Refills: 0 | COMMUNITY

## 2019-01-26 RX ORDER — SODIUM CHLORIDE 9 MG/ML
1000 INJECTION INTRAMUSCULAR; INTRAVENOUS; SUBCUTANEOUS ONCE
Qty: 0 | Refills: 0 | Status: COMPLETED | OUTPATIENT
Start: 2019-01-26 | End: 2019-01-26

## 2019-01-26 RX ADMIN — Medication 50 MILLIGRAM(S): at 05:51

## 2019-01-26 RX ADMIN — NALOXONE HYDROCHLORIDE 0.4 MILLIGRAM(S): 4 SPRAY NASAL at 01:03

## 2019-01-26 RX ADMIN — GABAPENTIN 400 MILLIGRAM(S): 400 CAPSULE ORAL at 22:00

## 2019-01-26 RX ADMIN — ENOXAPARIN SODIUM 40 MILLIGRAM(S): 100 INJECTION SUBCUTANEOUS at 09:03

## 2019-01-26 RX ADMIN — GABAPENTIN 400 MILLIGRAM(S): 400 CAPSULE ORAL at 05:51

## 2019-01-26 RX ADMIN — PANTOPRAZOLE SODIUM 40 MILLIGRAM(S): 20 TABLET, DELAYED RELEASE ORAL at 09:03

## 2019-01-26 RX ADMIN — SODIUM CHLORIDE 1000 MILLILITER(S): 9 INJECTION INTRAMUSCULAR; INTRAVENOUS; SUBCUTANEOUS at 01:17

## 2019-01-26 RX ADMIN — SODIUM CHLORIDE 75 MILLILITER(S): 9 INJECTION INTRAMUSCULAR; INTRAVENOUS; SUBCUTANEOUS at 09:02

## 2019-01-26 RX ADMIN — GABAPENTIN 400 MILLIGRAM(S): 400 CAPSULE ORAL at 13:53

## 2019-01-26 NOTE — ED ADULT NURSE NOTE - NSIMPLEMENTINTERV_GEN_ALL_ED
Implemented All Fall Risk Interventions:  [place] to call system. Call bell, personal items and telephone within reach. Instruct patient to call for assistance. Room bathroom lighting operational. Non-slip footwear when patient is off stretcher. Physically safe environment: no spills, clutter or unnecessary equipment. Stretcher in lowest position, wheels locked, appropriate side rails in place. Provide visual cue, wrist band, yellow gown, etc. Monitor gait and stability. Monitor for mental status changes and reorient to person, place, and time. Review medications for side effects contributing to fall risk. Reinforce activity limits and safety measures with patient and family.

## 2019-01-26 NOTE — H&P ADULT - PROBLEM SELECTOR PLAN 2
-Per patient, has history of arrhythmia, for which he takes Metoprolol.  -Admission EKG showed NSR@100bpm, nonspecific ST and T wave abnormalities.  -No arrhythmias seen on prior EKGs per chart review.  -Continue Metoprolol with hold parameters.  -Monitor on telemetry for arrhythmias.

## 2019-01-26 NOTE — ED ADULT NURSE NOTE - OBJECTIVE STATEMENT
Patient was BIBA due to unresponsiveness R/T  suspected drug OD. Upon arrival to the ED, the patient became a&ox4. Patient reports that he hasn't slept in 4 days and takes percocet at home. Denies OD on meds. Also reports that he doesn't remember what happened. He only remembers waking up at this hospital.

## 2019-01-26 NOTE — ED ADULT TRIAGE NOTE - CHIEF COMPLAINT QUOTE
patient found lethargic at home, known abuser of percocet and sleeping pills. Breathing shallow, patient pale, cool and clammy.

## 2019-01-26 NOTE — H&P ADULT - PROBLEM SELECTOR PLAN 1
-Admit to Telemetry.  -Episode likely due to overdose of Oxycodone given response to Naloxone 0.4mg IV x1 in ED.  -Doubt syncopal episode, new onset seizure activity.  -CT Head showed no acute intracranial hemorrhage, mass effect, or CT evidence of an acute vascular territorial infarct.  -Blood alcohol, salicylate, acetaminophen levels all wnl.  -Follow up Urine Toxicology.  -Neurochecks q4h.  -Further management for Cervical Disc Disease per Problem 3.  -Consider Neuro and/or Addiction medicine consult depending on clinical course.

## 2019-01-26 NOTE — H&P ADULT - NSHPSOCIALHISTORY_GEN_ALL_CORE
Marital Status:   Occupation:   Lives with: Brother  Ambulates at home:    Substance Use:  Tobacco Usage:   Alcohol Usage:   Illicit Drug Usage:     Advanced Directives: Marital Status: Single  Occupation: Disability  Lives with: Alone, brother lives in [place], but visits frequently  Ambulates at home: Without walker or cane    Substance Use:  Tobacco Usage: Denied  Alcohol Usage: Denied  Illicit Drug Usage: Denied    Advanced Directives: Full code

## 2019-01-26 NOTE — H&P ADULT - NSHPPHYSICALEXAM_GEN_ALL_CORE
Vital Signs Last 24 Hrs  T(C): --  T(F): --  HR: 92 (26 Jan 2019 01:04) (92 - 92)  BP: 93/64 (26 Jan 2019 01:04) (93/64 - 93/64)  BP(mean): --  RR: --  SpO2: 92% (26 Jan 2019 01:04) (92% - 92%) Vital Signs Last 24 Hrs  T(C): --  T(F): --  HR: 92 (26 Jan 2019 01:04) (92 - 92)  BP: 93/64 (26 Jan 2019 01:04) (93/64 - 93/64)  BP(mean): --  RR: --  SpO2: 92% (26 Jan 2019 01:04) (92% - 92%)    Physical Exam:  General: Well developed, NAD  HEENT: NCAT, moist mucous membranes  Neck: Supple, nontender, no mass  Neurology: A&Ox3, nonfocal, CN II-XII grossly intact, sensation intact  Respiratory: CTA B/L, No W/R/R  CV: RRR, +S1/S2, no murmurs  Abdominal: Soft, NT, ND +BSx4  Extremities: No LE edema bilaterally, + peripheral pulses  MSK: Limited cervical ROM, otherwise normal, no joint erythema or warmth, no joint swelling  Skin: +erythematous spots on his face, upper extremities bilaterally, no drainage noted

## 2019-01-26 NOTE — ED PROVIDER NOTE - OBJECTIVE STATEMENT
59 y/o WM h/o  migraine  Herniated cervical disc  Hypothyroid  Fibromyalgia chronic pain sleep deprivation.  The patient was found on the bathroom floor by his brother, he was moaning. He was previously seen sleeping in a chair around 10pm and during the day he appeared alert and oriented and at his baseline mental status. His brother states that he hasn't slept in three days, that his sleeping pills are ineffective. He also suffers from chronic pain, he takes prescription medications and OTCs for the pain. In the ED he was noted extremely lethargic and hypoventilating. He was emergently given IV Narcan .4mg. His level of alertness improved but his brother states that he is not at his normal baseline. No CP, no SOB, no fever, no chills, no GI, no urinary symptoms, no acute stroke symptoms. 57 y/o WM h/o  migraine  Herniated cervical disc  Hypothyroid  Fibromyalgia chronic pain sleep deprivation.  The patient was found on the bathroom floor by his brother, he was moaning. He was previously seen sleeping in a chair around 10pm and during the day he appeared alert and oriented and at his baseline mental status. His brother states that he hasn't slept in three days, that his sleeping pills are ineffective. He also suffers from chronic pain, he takes prescription medications and OTCs for the pain. In the ED he was noted extremely lethargic and hypoventilating. He was emergently given IV Narcan .4mg. His level of alertness improved but his brother states that he is not at his normal baseline. No CP, no SOB, no fever, no chills, no GI, no urinary symptoms, no acute stroke symptoms. He lives with his brother, disabled due to cervical disc injury. no suicidal, no homicidal ideation. 57 y/o WM h/o  migraine  Herniated cervical disc  Hypothyroid  Fibromyalgia chronic pain sleep deprivation.  The patient was found on the bathroom floor by his brother, he was moaning. He was previously seen sleeping in a chair around 10pm and during the day he appeared alert and oriented and at his baseline mental status. His brother states that he hasn't slept in three days, that his sleeping pills are ineffective. He also suffers from chronic pain, he takes prescription medications and OTCs for the pain. He took benadryl to sleep this evening.  In the ED he was noted extremely lethargic and hypoventilating. He was emergently given IV Narcan .4mg. His level of alertness improved but his brother states that he is not at his normal baseline. No CP, no SOB, no fever, no chills, no GI, no urinary symptoms, no acute stroke symptoms. He lives with his brother, disabled due to cervical disc injury. no suicidal, no homicidal ideation. 2:30am the patient appears to be back at his baseline. He states that he had a similar syncope and collapse in 8/2018. He was treated at [facility]

## 2019-01-26 NOTE — CHART NOTE - NSCHARTNOTEFT_GEN_A_CORE
Page by nursing for Bladder Scan >200cc  Pt distended and complaining of suprapubic pain  Ordered for Straight Cath x1 with +1100cc   Ordered to maintain [place] and monitor UOP ovn Page by nursing for Bladder Scan >200cc, attempted to ambulate pt and was unable to urinate while standing  Pt distended and complaining of suprapubic pain  Ordered for Straight Cath x1 with +1100cc   Please maintain [place] and monitor UOP ovn

## 2019-01-26 NOTE — H&P ADULT - PROBLEM SELECTOR PLAN 3
-Chronic pain due to Cervical Disc Disease.  -Hold opioids for now until reassessed by primary team.   -If breakthrough pain medication required, may give half his home dose (Oxycodone 7.5mg PO q6h).  -Follow up official read of XRay Cervical Spine. -Chronic pain due to Cervical Disc Disease.  -Continue with Neurontin.  -Hold opioids for now until reassessed by primary team.   -If breakthrough pain medication required, may give half his home dose (Oxycodone 7.5mg PO q6h).  -Follow up official read of XRay Cervical Spine.

## 2019-01-26 NOTE — H&P ADULT - HISTORY OF PRESENT ILLNESS
57 yo M with PMH of Cervical Disc Disease, Fibromyalgia, Hypothyroidism, Anxiety, Migraines, presented to the ED     Per ED note:   The patient was found on the bathroom floor by his brother, he was moaning. He was previously seen sleeping in a chair around 10pm and during the day he appeared alert and oriented and at his baseline mental status. His brother states that he hasn't slept in three days, that his sleeping pills are ineffective. He also suffers from chronic pain, he takes prescription medications and OTCs for the pain. He took benadryl to sleep this evening.  In the ED he was noted extremely lethargic and hypoventilating. He was emergently given IV Narcan .4mg. His level of alertness improved but his brother states that he is not at his normal baseline. No CP, no SOB, no fever, no chills, no GI, no urinary symptoms, no acute stroke symptoms. He lives with his brother, disabled due to cervical disc injury. no suicidal, no homicidal ideation. 2:30am the patient appears to be back at his baseline. He states that he had a similar syncope and collapse in 8/2018. He was treated at [facility]      In the ED, he was initially hypotensive (BP 93/64), otherwise hemodynamically stable.  NIH Stroke Scale score 0.  EKG: NSR@100bpm.  Labs: Elevated BUN (32), elevated Alk Phos (151); no leukocytosis, no anemia, no electrolyte abnormalities, normal Cr, normal eGFR, troponin negative x1.   Blood alcohol, salicylate, acetaminophen levels all wnl.  Urine Toxicology pending.  CXR: Preliminary read, negative for infiltrates, consolidations, and effusions.  Xray Cervical Spine: Preliminary read, negative for acute fracture.  CT Head: No acute intracranial hemorrhage, mass effect, or CT evidence of an acute vascular territorial infarct.  Received Naloxone 0.4mg IV x1, NS Bolus 1L x1. 57 yo M with PMH of Cervical Disc Disease, Fibromyalgia, ?Arrhythmia, Hypothyroidism, Anxiety, Migraines, GERD, Insomnia, presented to the ED after being found moaning on the bathroom floor by his brother. History confirmed with patient, who is not the best historian, and chart review. Call placed to patient's brother, but could not be reached. Patient was previously seen sleeping in a chair at home around 22:00 on night of admission. Per his brother, he had appeared at his baseline mental status earlier in the day. His brother also stated that he had not slept for the past 3 days despite taking Benadryl, which he uses chronically for insomnia; last dose taken on evening of admission. Of note, patient has chronic pain due to his Cervical Disc Disease, Fibromyalgia, and takes Oxycodone 15mg PO q6h prn pain, and reported being compliant with this dose. Per chart review, he appeared extremely lethargic and was noted to be hypoventilating upon presentation to the ED. He was emergently given Narcan 0.4mg IV x1, after which his alertness improved, although his brother stated that he was not quite at his baseline mental status. According to chart review, patient returned to his baseline mental status at approximately 02:30. Currently, he admitted to chronic pain and fatigue with no new pain, as well as a chronic itchy rash on his face, UE bilaterally. He also stated that he had no recollection of the events leading up to his arrival in [place] ED. No other acute complaints at this time.    Of note, he experienced a similar episode in 8/2018, which resolved without any intervention.    In the ED, he was initially hypotensive (BP 93/64), otherwise hemodynamically stable.  NIH Stroke Scale score 0.  EKG: NSR@100bpm, nonspecific ST and T wave abnormalities.  Labs: Elevated BUN (32), elevated Alk Phos (151); no leukocytosis, no anemia, no electrolyte abnormalities, normal Cr, normal eGFR, troponin negative x1.   Blood alcohol, salicylate, acetaminophen levels all wnl.  Urine Toxicology pending.  CXR: Preliminary read, negative for infiltrates, consolidations, and effusions.  Xray Cervical Spine: Preliminary read, negative for acute fracture.  CT Head: No acute intracranial hemorrhage, mass effect, or CT evidence of an acute vascular territorial infarct.  Received Naloxone 0.4mg IV x1, NS Bolus 1L x1.

## 2019-01-26 NOTE — H&P ADULT - ASSESSMENT
59 yo M with PMH of Cervical Disc Disease, Fibromyalgia, ?Arrhythmia, Hypothyroidism, Anxiety, Migraines, GERD, Insomnia, presented to the ED after being found moaning on the bathroom floor by his brother, admitted for AMS likely secondary to opioid overdose.

## 2019-01-26 NOTE — H&P ADULT - PMH
Anxiety    Back pain    Fibromyalgia    Headache, migraine    Herniated cervical disc    Hypothyroid    Radiculopathy  Rt. Arm Anxiety    Back pain    Fibromyalgia    GERD (gastroesophageal reflux disease)    Headache, migraine    Herniated cervical disc    Hypothyroid    Insomnia    Radiculopathy  Rt. Arm

## 2019-01-26 NOTE — ED ADULT TRIAGE NOTE - MEANS OF ARRIVAL
Tried to call pt. Number disconnected. Pt needs to make appointment prior to refill. Advised pt. In December to have primary do refills.
stretcher

## 2019-01-26 NOTE — H&P ADULT - NSHPREVIEWOFSYSTEMS_GEN_ALL_CORE
Constitutional: reports fatigue, denies fever, chills, diaphoresis   HEENT: denies blurry vision, difficulty hearing  Respiratory: denies SOB, ANDINO, cough, sputum production, wheezing  Cardiovascular: denies chest pain, palpitations, edema  Gastrointestinal: denies nausea, vomiting, diarrhea, constipation, abdominal pain  Genitourinary: denies dysuria, frequency, urgency, hematuria   Skin/Breast: reports rash, itching  Musculoskeletal: reports myalgias, denies joint swelling, muscle weakness  Neurologic: reports weakness, chronic pain, denies headache, dizziness, paresthesias, numbness/tingling  Psychiatric: denies feeling anxious, depressed  Hematology/Oncology: denies bruising, tender or enlarged lymph nodes

## 2019-01-27 DIAGNOSIS — R55 SYNCOPE AND COLLAPSE: ICD-10-CM

## 2019-01-27 DIAGNOSIS — R33.9 RETENTION OF URINE, UNSPECIFIED: ICD-10-CM

## 2019-01-27 DIAGNOSIS — M79.7 FIBROMYALGIA: ICD-10-CM

## 2019-01-27 LAB
AMPHET UR-MCNC: NEGATIVE — SIGNIFICANT CHANGE UP
ANION GAP SERPL CALC-SCNC: 6 MMOL/L — SIGNIFICANT CHANGE UP (ref 5–17)
BARBITURATES UR SCN-MCNC: NEGATIVE — SIGNIFICANT CHANGE UP
BENZODIAZ UR-MCNC: NEGATIVE — SIGNIFICANT CHANGE UP
BUN SERPL-MCNC: 25 MG/DL — HIGH (ref 7–23)
CALCIUM SERPL-MCNC: 8.2 MG/DL — LOW (ref 8.5–10.1)
CHLORIDE SERPL-SCNC: 104 MMOL/L — SIGNIFICANT CHANGE UP (ref 96–108)
CK SERPL-CCNC: 4422 U/L — HIGH (ref 26–308)
CO2 SERPL-SCNC: 30 MMOL/L — SIGNIFICANT CHANGE UP (ref 22–31)
COCAINE METAB.OTHER UR-MCNC: NEGATIVE — SIGNIFICANT CHANGE UP
CREAT SERPL-MCNC: 1.1 MG/DL — SIGNIFICANT CHANGE UP (ref 0.5–1.3)
GLUCOSE SERPL-MCNC: 93 MG/DL — SIGNIFICANT CHANGE UP (ref 70–99)
HCT VFR BLD CALC: 41.1 % — SIGNIFICANT CHANGE UP (ref 39–50)
HGB BLD-MCNC: 13.5 G/DL — SIGNIFICANT CHANGE UP (ref 13–17)
MAGNESIUM SERPL-MCNC: 2.3 MG/DL — SIGNIFICANT CHANGE UP (ref 1.6–2.6)
MCHC RBC-ENTMCNC: 30.4 PG — SIGNIFICANT CHANGE UP (ref 27–34)
MCHC RBC-ENTMCNC: 32.8 GM/DL — SIGNIFICANT CHANGE UP (ref 32–36)
MCV RBC AUTO: 92.6 FL — SIGNIFICANT CHANGE UP (ref 80–100)
METHADONE UR-MCNC: NEGATIVE — SIGNIFICANT CHANGE UP
NRBC # BLD: 0 /100 WBCS — SIGNIFICANT CHANGE UP (ref 0–0)
OPIATES UR-MCNC: POSITIVE — SIGNIFICANT CHANGE UP
PCP SPEC-MCNC: SIGNIFICANT CHANGE UP
PCP UR-MCNC: NEGATIVE — SIGNIFICANT CHANGE UP
PLATELET # BLD AUTO: 169 K/UL — SIGNIFICANT CHANGE UP (ref 150–400)
POTASSIUM SERPL-MCNC: 4 MMOL/L — SIGNIFICANT CHANGE UP (ref 3.5–5.3)
POTASSIUM SERPL-SCNC: 4 MMOL/L — SIGNIFICANT CHANGE UP (ref 3.5–5.3)
RBC # BLD: 4.44 M/UL — SIGNIFICANT CHANGE UP (ref 4.2–5.8)
RBC # FLD: 13.3 % — SIGNIFICANT CHANGE UP (ref 10.3–14.5)
SODIUM SERPL-SCNC: 140 MMOL/L — SIGNIFICANT CHANGE UP (ref 135–145)
THC UR QL: NEGATIVE — SIGNIFICANT CHANGE UP
TROPONIN I SERPL-MCNC: <.015 NG/ML — SIGNIFICANT CHANGE UP (ref 0.01–0.04)
TSH SERPL-MCNC: 0.24 UIU/ML — LOW (ref 0.36–3.74)
WBC # BLD: 11.87 K/UL — HIGH (ref 3.8–10.5)
WBC # FLD AUTO: 11.87 K/UL — HIGH (ref 3.8–10.5)

## 2019-01-27 PROCEDURE — 93010 ELECTROCARDIOGRAM REPORT: CPT

## 2019-01-27 RX ORDER — TAMSULOSIN HYDROCHLORIDE 0.4 MG/1
0.4 CAPSULE ORAL AT BEDTIME
Qty: 0 | Refills: 0 | Status: DISCONTINUED | OUTPATIENT
Start: 2019-01-27 | End: 2019-01-29

## 2019-01-27 RX ADMIN — GABAPENTIN 400 MILLIGRAM(S): 400 CAPSULE ORAL at 14:51

## 2019-01-27 RX ADMIN — OXYCODONE AND ACETAMINOPHEN 1 TABLET(S): 5; 325 TABLET ORAL at 02:42

## 2019-01-27 RX ADMIN — OXYCODONE AND ACETAMINOPHEN 1 TABLET(S): 5; 325 TABLET ORAL at 15:46

## 2019-01-27 RX ADMIN — OXYCODONE AND ACETAMINOPHEN 1 TABLET(S): 5; 325 TABLET ORAL at 21:49

## 2019-01-27 RX ADMIN — Medication 650 MILLIGRAM(S): at 08:33

## 2019-01-27 RX ADMIN — GABAPENTIN 400 MILLIGRAM(S): 400 CAPSULE ORAL at 21:49

## 2019-01-27 RX ADMIN — OXYCODONE AND ACETAMINOPHEN 1 TABLET(S): 5; 325 TABLET ORAL at 03:00

## 2019-01-27 RX ADMIN — GABAPENTIN 400 MILLIGRAM(S): 400 CAPSULE ORAL at 05:41

## 2019-01-27 RX ADMIN — OXYCODONE AND ACETAMINOPHEN 1 TABLET(S): 5; 325 TABLET ORAL at 11:19

## 2019-01-27 RX ADMIN — OXYCODONE AND ACETAMINOPHEN 1 TABLET(S): 5; 325 TABLET ORAL at 22:00

## 2019-01-27 RX ADMIN — OXYCODONE AND ACETAMINOPHEN 1 TABLET(S): 5; 325 TABLET ORAL at 16:46

## 2019-01-27 RX ADMIN — ENOXAPARIN SODIUM 40 MILLIGRAM(S): 100 INJECTION SUBCUTANEOUS at 08:37

## 2019-01-27 RX ADMIN — PANTOPRAZOLE SODIUM 40 MILLIGRAM(S): 20 TABLET, DELAYED RELEASE ORAL at 05:41

## 2019-01-27 RX ADMIN — TAMSULOSIN HYDROCHLORIDE 0.4 MILLIGRAM(S): 0.4 CAPSULE ORAL at 21:49

## 2019-01-27 RX ADMIN — OXYCODONE AND ACETAMINOPHEN 1 TABLET(S): 5; 325 TABLET ORAL at 10:19

## 2019-01-27 RX ADMIN — Medication 650 MILLIGRAM(S): at 09:33

## 2019-01-27 NOTE — PROGRESS NOTE ADULT - SUBJECTIVE AND OBJECTIVE BOX
Patient is a 58y old  Male who presents with a chief complaint of Syncope, AMS (26 Jan 2019 03:49)  feels well, without complaints.  much more lucid.  further discussed w pt this am- he believes he may have suffered syncopal episode, and admits to recent palpitations  denies chest pain, denies shortness of breath      INTERVAL HPI/OVERNIGHT EVENTS:  T(C): 36.4 (01-27-19 @ 08:00), Max: 37.1 (01-26-19 @ 13:51)  HR: 80 (01-27-19 @ 10:17) (76 - 86)  BP: 102/60 (01-27-19 @ 10:17) (90/59 - 106/69)  RR: 17 (01-27-19 @ 08:00) (17 - 18)  SpO2: 97% (01-27-19 @ 08:00) (97% - 100%)  Wt(kg): --  I&O's Summary    26 Jan 2019 07:01  -  27 Jan 2019 07:00  --------------------------------------------------------  IN: 0 mL / OUT: 1200 mL / NET: -1200 mL        LABS:                        13.5   11.87 )-----------( 169      ( 27 Jan 2019 05:35 )             41.1     01-27    140  |  104  |  25<H>  ----------------------------<  93  4.0   |  30  |  1.10    Ca    8.2<L>      27 Jan 2019 05:35    TPro  6.9  /  Alb  3.4  /  TBili  1.1  /  DBili  x   /  AST  143<H>  /  ALT  52  /  AlkPhos  117  01-26        CAPILLARY BLOOD GLUCOSE                MEDICATIONS  (STANDING):  enoxaparin Injectable 40 milliGRAM(s) SubCutaneous every 24 hours  gabapentin 400 milliGRAM(s) Oral three times a day  metoprolol tartrate 50 milliGRAM(s) Oral three times a day  pantoprazole    Tablet 40 milliGRAM(s) Oral before breakfast  sodium chloride 0.9%. 1000 milliLiter(s) (75 mL/Hr) IV Continuous <Continuous>  tamsulosin 0.4 milliGRAM(s) Oral at bedtime    MEDICATIONS  (PRN):  acetaminophen   Tablet .. 650 milliGRAM(s) Oral every 6 hours PRN Mild Pain (1 - 3)  hydrOXYzine hydrochloride 25 milliGRAM(s) Oral at bedtime PRN Anxiety  ondansetron   Disintegrating Tablet 4 milliGRAM(s) Oral three times a day PRN Nausea and/or Vomiting  oxyCODONE    5 mG/acetaminophen 325 mG 1 Tablet(s) Oral every 4 hours PRN Severe Pain (7 - 10)      REVIEW OF SYSTEMS:  CONSTITUTIONAL: No fever, weight loss, or fatigue  EYES: No eye pain, visual disturbances, or discharge  ENMT:  No difficulty hearing, tinnitus, vertigo; No sinus or throat pain  NECK: No pain or stiffness  RESPIRATORY: No cough, wheezing, chills or hemoptysis; No shortness of breath  CARDIOVASCULAR: No chest pain, palpitations, dizziness, or leg swelling  GASTROINTESTINAL: No abdominal or epigastric pain. No nausea, vomiting, or hematemesis; No diarrhea or constipation. No melena or hematochezia.  GENITOURINARY: No dysuria, frequency, hematuria, or incontinence  NEUROLOGICAL: No headaches, memory loss, loss of strength, numbness, or tremors  SKIN: No itching, burning, rashes, or lesions   LYMPH NODES: No enlarged glands  ENDOCRINE: No heat or cold intolerance; No hair loss  MUSCULOSKELETAL: No joint pain or swelling; No muscle, back, or extremity pain  PSYCHIATRIC: No depression, anxiety, mood swings, or difficulty sleeping  HEME/LYMPH: No easy bruising, or bleeding gums  ALLERY AND IMMUNOLOGIC: No hives or eczema    RADIOLOGY & ADDITIONAL TESTS:    Imaging Personally Reviewed:  [ ] YES  [ ] NO    Consultant(s) Notes Reviewed:  [ ] YES  [ ] NO    PHYSICAL EXAM:  GENERAL: NAD, well-groomed, well-developed  HEAD:  Atraumatic, Normocephalic  EYES: EOMI, PERRLA, conjunctiva and sclera clear  ENMT: No tonsillar erythema, exudates, or enlargement; Moist mucous membranes, Good dentition, No lesions  NECK: Supple, No JVD, Normal thyroid  NERVOUS SYSTEM:  Alert & Oriented X3, Good concentration; Motor Strength 5/5 B/L upper and lower extremities; DTRs 2+ intact and symmetric  CHEST/LUNG: Clear to percussion bilaterally; No rales, rhonchi, wheezing, or rubs  HEART: Regular rate and rhythm; No murmurs, rubs, or gallops  ABDOMEN: Soft, Nontender, Nondistended; Bowel sounds present  EXTREMITIES:  2+ Peripheral Pulses, No clubbing, cyanosis, or edema  LYMPH: No lymphadenopathy noted  SKIN: scattered diffuse punctate lesions    Care Discussed with Consultants/Other Providers [ ] YES  [ ] NO

## 2019-01-27 NOTE — CONSULT NOTE ADULT - SUBJECTIVE AND OBJECTIVE BOX
CARDIOLOGY CONSULT NOTE    Patient is a 58y Male with a known history of :  Urinary retention (R33.9)  Fibromyalgia (M79.7)  Syncope and collapse (R55)  Need for prophylactic measure (Z29.9)  Headache, migraine (G43.909)  GERD (gastroesophageal reflux disease) (K21.9)  Anxiety (F41.9)  Insomnia (G47.00)  Herniated cervical disc (M50.20)  Altered mental status (R41.82)    HPI:  59 yo M with PMH of Cervical Disc Disease, Fibromyalgia, ?Arrhythmia, Hypothyroidism, Anxiety, Migraines, GERD, Insomnia, presented to the ED after being found moaning on the bathroom floor by his brother. History confirmed with patient, who is not the best historian, and chart review. Call placed to patient's brother, but could not be reached. Patient was previously seen sleeping in a chair at home around 22:00 on night of admission. Per his brother, he had appeared at his baseline mental status earlier in the day. His brother also stated that he had not slept for the past 3 days despite taking Benadryl, which he uses chronically for insomnia; last dose taken on evening of admission. Of note, patient has chronic pain due to his Cervical Disc Disease, Fibromyalgia, and takes Oxycodone 15mg PO q6h prn pain, and reported being compliant with this dose. Per chart review, he appeared extremely lethargic and was noted to be hypoventilating upon presentation to the ED. He was emergently given Narcan 0.4mg IV x1, after which his alertness improved, although his brother stated that he was not quite at his baseline mental status. According to chart review, patient returned to his baseline mental status at approximately 02:30. Currently, he admitted to chronic pain and fatigue with no new pain, as well as a chronic itchy rash on his face, UE bilaterally. He also stated that he had no recollection of the events leading up to his arrival in [facility] ED. No other acute complaints at this time.    Of note, he experienced a similar episode in 8/2018, which resolved without any intervention.    In the ED, he was initially hypotensive (BP 93/64), otherwise hemodynamically stable.  NIH Stroke Scale score 0.  EKG: NSR@100bpm, nonspecific ST and T wave abnormalities.  Labs: Elevated BUN (32), elevated Alk Phos (151); no leukocytosis, no anemia, no electrolyte abnormalities, normal Cr, normal eGFR, troponin negative x1.   Blood alcohol, salicylate, acetaminophen levels all wnl.  Urine Toxicology pending.  CXR: Preliminary read, negative for infiltrates, consolidations, and effusions.  Xray Cervical Spine: Preliminary read, negative for acute fracture.  CT Head: No acute intracranial hemorrhage, mass effect, or CT evidence of an acute vascular territorial infarct.  Received Naloxone 0.4mg IV x1, NS Bolus 1L x1. (26 Jan 2019 03:49)      REVIEW OF SYSTEMS:    CONSTITUTIONAL: No fever, weight loss, or fatigue  EYES: No eye pain, visual disturbances, or discharge  ENMT:  No difficulty hearing, tinnitus, vertigo; No sinus or throat pain  NECK: No pain or stiffness  BREASTS: No pain, masses, or nipple discharge  RESPIRATORY: No cough, wheezing, chills or hemoptysis; No shortness of breath  CARDIOVASCULAR: No chest pain, palpitations, dizziness, or leg swelling  GASTROINTESTINAL: No abdominal or epigastric pain. No nausea, vomiting, or hematemesis; No diarrhea or constipation. No melena or hematochezia.  GENITOURINARY: No dysuria, frequency, hematuria, or incontinence  NEUROLOGICAL: No headaches, memory loss, loss of strength, numbness, or tremors  SKIN: No itching, burning, rashes, or lesions   LYMPH NODES: No enlarged glands  ENDOCRINE: No heat or cold intolerance; No hair loss  MUSCULOSKELETAL: No joint pain or swelling; No muscle, back, or extremity pain  PSYCHIATRIC: No depression, anxiety, mood swings, or difficulty sleeping  HEME/LYMPH: No easy bruising, or bleeding gums  ALLERGY AND IMMUNOLOGIC: No hives or eczema    MEDICATIONS  (STANDING):  enoxaparin Injectable 40 milliGRAM(s) SubCutaneous every 24 hours  gabapentin 400 milliGRAM(s) Oral three times a day  metoprolol tartrate 50 milliGRAM(s) Oral three times a day  pantoprazole    Tablet 40 milliGRAM(s) Oral before breakfast  sodium chloride 0.9%. 1000 milliLiter(s) (75 mL/Hr) IV Continuous <Continuous>  tamsulosin 0.4 milliGRAM(s) Oral at bedtime    MEDICATIONS  (PRN):  acetaminophen   Tablet .. 650 milliGRAM(s) Oral every 6 hours PRN Mild Pain (1 - 3)  hydrOXYzine hydrochloride 25 milliGRAM(s) Oral at bedtime PRN Anxiety  ondansetron   Disintegrating Tablet 4 milliGRAM(s) Oral three times a day PRN Nausea and/or Vomiting  oxyCODONE    5 mG/acetaminophen 325 mG 1 Tablet(s) Oral every 4 hours PRN Severe Pain (7 - 10)      ALLERGIES: clams (Stomach Upset)  prednisoLONE (Unknown)      FAMILY HISTORY:  Family history of acute myocardial infarction (Sibling)      Social History:  Alochol:   Smoking:   Drug Use:   Marital Status:     I&O's Detail    26 Jan 2019 07:01  -  27 Jan 2019 07:00  --------------------------------------------------------  IN:  Total IN: 0 mL    OUT:    Indwelling Catheter - Urethral: 1200 mL  Total OUT: 1200 mL    Total NET: -1200 mL          PHYSICAL EXAMINATION:  -----------------------------  T(C): 36.7 (01-27-19 @ 12:00), Max: 37.1 (01-26-19 @ 13:51)  HR: 80 (01-27-19 @ 12:00) (76 - 86)  BP: 102/59 (01-27-19 @ 12:00) (90/59 - 106/69)  RR: 17 (01-27-19 @ 12:00) (17 - 18)  SpO2: 97% (01-27-19 @ 12:00) (97% - 100%)  Wt(kg): --    01-26 @ 07:01  -  01-27 @ 07:00  --------------------------------------------------------  IN:  Total IN: 0 mL    OUT:    Indwelling Catheter - Urethral: 1200 mL  Total OUT: 1200 mL    Total NET: -1200 mL            Constitutional: well developed, normal appearance, well groomed, well nourished, no deformities and no acute distress.   Eyes: the conjunctiva exhibited no abnormalities and the eyelids demonstrated no xanthelasmas.   HEENT: normal oral mucosa, no oral pallor and no oral cyanosis.   Neck: normal jugular venous A waves present, normal jugular venous V waves present and no jugular venous edmond A waves.   Pulmonary: no respiratory distress, normal respiratory rhythm and effort, no accessory muscle use and lungs were clear to auscultation bilaterally. Anteriorly  Cardiovascular: heart rate and rhythm were normal, normal S1 and S2 and no murmur, gallop, rub, heave or thrill are present.   Musculoskeletal: the gait could not be assessed.   Extremities: no clubbing of the fingernails, no localized cyanosis, no petechial hemorrhages and no ischemic changes.   Skin: normal skin color and pigmentation, no rash, no venous stasis, no skin lesions, no skin ulcer and no xanthoma was observed.   Psychiatric: oriented to person, place, and time, the affect was normal, the mood was normal and not feeling anxious.     LABS:   --------  01-27    140  |  104  |  25<H>  ----------------------------<  93  4.0   |  30  |  1.10    Ca    8.2<L>      27 Jan 2019 05:35    TPro  6.9  /  Alb  3.4  /  TBili  1.1  /  DBili  x   /  AST  143<H>  /  ALT  52  /  AlkPhos  117  01-26                         13.5   11.87 )-----------( 169      ( 27 Jan 2019 05:35 )             41.1         01-27 @ 02:50 CPK total:--, CKMB --, Troponin I - <.015 ng/mL [.015 - .045]  01-26 @ 01:13 CPK total:--, CKMB --, Troponin I - <.015 ng/mL [.015 - .045]            RADIOLOGY:  -----------------        ECG: NSR, diffuse non-specific ST changes

## 2019-01-27 NOTE — CONSULT NOTE ADULT - ASSESSMENT
59y/[facility]  History Cervical neck pain, fibromyalgias, ?arrhythmia, palpitations, thyroid disease, anxiety, migraine H/A, GERD  Mother and Father with CAD  S/P B/L hernia surgery  S/P nasal surgery   S/P esophageal stricture surgery    Admitted after being found moaning in bathroom by Brother.  Patient does not remember incident.  He also states that he has not slept in 3 days.  He may have had similar episode 8/18.  Patient also relates that about 3 years ago he had a normal Nuclear Stress test and Cardiac catheterization without intervention at [facility].    Impression:  Possible recurrent syncope as described above   This may be related to his Oxycodone, Benadryl-like medication and lack of sleep    Plan:  - Acute myocardial infarction ruled out by 2 negative troponins.  - Patient currently having EEG.  - Continue Metoprolol.  - Check TSH.  - Echocardiogram.  - Repeat EKG in am.

## 2019-01-27 NOTE — PROGRESS NOTE ADULT - PROBLEM SELECTOR PLAN 1
suspect secondary to medications/ polypharmacy suspect secondary to medications/ polypharmacy  psych eval dr Santiago  pt denies depression or suicidal ideations

## 2019-01-28 ENCOUNTER — TRANSCRIPTION ENCOUNTER (OUTPATIENT)
Age: 59
End: 2019-01-28

## 2019-01-28 DIAGNOSIS — R20.0 ANESTHESIA OF SKIN: ICD-10-CM

## 2019-01-28 DIAGNOSIS — R00.2 PALPITATIONS: ICD-10-CM

## 2019-01-28 DIAGNOSIS — Z71.89 OTHER SPECIFIED COUNSELING: ICD-10-CM

## 2019-01-28 DIAGNOSIS — M62.82 RHABDOMYOLYSIS: ICD-10-CM

## 2019-01-28 LAB
ANION GAP SERPL CALC-SCNC: 8 MMOL/L — SIGNIFICANT CHANGE UP (ref 5–17)
BUN SERPL-MCNC: 13 MG/DL — SIGNIFICANT CHANGE UP (ref 7–23)
CALCIUM SERPL-MCNC: 8.3 MG/DL — LOW (ref 8.5–10.1)
CHLORIDE SERPL-SCNC: 105 MMOL/L — SIGNIFICANT CHANGE UP (ref 96–108)
CO2 SERPL-SCNC: 26 MMOL/L — SIGNIFICANT CHANGE UP (ref 22–31)
CREAT SERPL-MCNC: 0.7 MG/DL — SIGNIFICANT CHANGE UP (ref 0.5–1.3)
GLUCOSE SERPL-MCNC: 88 MG/DL — SIGNIFICANT CHANGE UP (ref 70–99)
HCT VFR BLD CALC: 38.7 % — LOW (ref 39–50)
HGB BLD-MCNC: 12.9 G/DL — LOW (ref 13–17)
MAGNESIUM SERPL-MCNC: 2 MG/DL — SIGNIFICANT CHANGE UP (ref 1.6–2.6)
MCHC RBC-ENTMCNC: 30.3 PG — SIGNIFICANT CHANGE UP (ref 27–34)
MCHC RBC-ENTMCNC: 33.3 GM/DL — SIGNIFICANT CHANGE UP (ref 32–36)
MCV RBC AUTO: 90.8 FL — SIGNIFICANT CHANGE UP (ref 80–100)
NRBC # BLD: 0 /100 WBCS — SIGNIFICANT CHANGE UP (ref 0–0)
PHOSPHATE SERPL-MCNC: 2.1 MG/DL — LOW (ref 2.5–4.5)
PLATELET # BLD AUTO: 161 K/UL — SIGNIFICANT CHANGE UP (ref 150–400)
POTASSIUM SERPL-MCNC: 3.7 MMOL/L — SIGNIFICANT CHANGE UP (ref 3.5–5.3)
POTASSIUM SERPL-SCNC: 3.7 MMOL/L — SIGNIFICANT CHANGE UP (ref 3.5–5.3)
RBC # BLD: 4.26 M/UL — SIGNIFICANT CHANGE UP (ref 4.2–5.8)
RBC # FLD: 12.9 % — SIGNIFICANT CHANGE UP (ref 10.3–14.5)
SODIUM SERPL-SCNC: 139 MMOL/L — SIGNIFICANT CHANGE UP (ref 135–145)
WBC # BLD: 7.39 K/UL — SIGNIFICANT CHANGE UP (ref 3.8–10.5)
WBC # FLD AUTO: 7.39 K/UL — SIGNIFICANT CHANGE UP (ref 3.8–10.5)

## 2019-01-28 PROCEDURE — 70551 MRI BRAIN STEM W/O DYE: CPT | Mod: 26

## 2019-01-28 PROCEDURE — 93010 ELECTROCARDIOGRAM REPORT: CPT

## 2019-01-28 PROCEDURE — 72141 MRI NECK SPINE W/O DYE: CPT | Mod: 26

## 2019-01-28 RX ORDER — DIPHENHYDRAMINE HCL 50 MG
1 CAPSULE ORAL
Qty: 0 | Refills: 0 | COMMUNITY

## 2019-01-28 RX ORDER — SODIUM CHLORIDE 9 MG/ML
1000 INJECTION INTRAMUSCULAR; INTRAVENOUS; SUBCUTANEOUS
Qty: 0 | Refills: 0 | Status: DISCONTINUED | OUTPATIENT
Start: 2019-01-28 | End: 2019-01-29

## 2019-01-28 RX ORDER — OXYCODONE HYDROCHLORIDE 5 MG/1
1 TABLET ORAL
Qty: 0 | Refills: 0 | COMMUNITY

## 2019-01-28 RX ORDER — SODIUM,POTASSIUM PHOSPHATES 278-250MG
2 POWDER IN PACKET (EA) ORAL ONCE
Qty: 0 | Refills: 0 | Status: COMPLETED | OUTPATIENT
Start: 2019-01-28 | End: 2019-01-28

## 2019-01-28 RX ADMIN — PANTOPRAZOLE SODIUM 40 MILLIGRAM(S): 20 TABLET, DELAYED RELEASE ORAL at 05:27

## 2019-01-28 RX ADMIN — Medication 2 PACKET(S): at 11:05

## 2019-01-28 RX ADMIN — OXYCODONE AND ACETAMINOPHEN 1 TABLET(S): 5; 325 TABLET ORAL at 03:57

## 2019-01-28 RX ADMIN — Medication 650 MILLIGRAM(S): at 08:54

## 2019-01-28 RX ADMIN — Medication 50 MILLIGRAM(S): at 05:27

## 2019-01-28 RX ADMIN — OXYCODONE AND ACETAMINOPHEN 1 TABLET(S): 5; 325 TABLET ORAL at 21:46

## 2019-01-28 RX ADMIN — OXYCODONE AND ACETAMINOPHEN 1 TABLET(S): 5; 325 TABLET ORAL at 04:00

## 2019-01-28 RX ADMIN — ONDANSETRON 4 MILLIGRAM(S): 8 TABLET, FILM COATED ORAL at 08:53

## 2019-01-28 RX ADMIN — OXYCODONE AND ACETAMINOPHEN 1 TABLET(S): 5; 325 TABLET ORAL at 16:38

## 2019-01-28 RX ADMIN — OXYCODONE AND ACETAMINOPHEN 1 TABLET(S): 5; 325 TABLET ORAL at 17:04

## 2019-01-28 RX ADMIN — Medication 650 MILLIGRAM(S): at 09:37

## 2019-01-28 RX ADMIN — SODIUM CHLORIDE 75 MILLILITER(S): 9 INJECTION INTRAMUSCULAR; INTRAVENOUS; SUBCUTANEOUS at 11:01

## 2019-01-28 RX ADMIN — TAMSULOSIN HYDROCHLORIDE 0.4 MILLIGRAM(S): 0.4 CAPSULE ORAL at 21:22

## 2019-01-28 RX ADMIN — ENOXAPARIN SODIUM 40 MILLIGRAM(S): 100 INJECTION SUBCUTANEOUS at 08:49

## 2019-01-28 RX ADMIN — OXYCODONE AND ACETAMINOPHEN 1 TABLET(S): 5; 325 TABLET ORAL at 22:22

## 2019-01-28 RX ADMIN — GABAPENTIN 400 MILLIGRAM(S): 400 CAPSULE ORAL at 13:20

## 2019-01-28 RX ADMIN — GABAPENTIN 400 MILLIGRAM(S): 400 CAPSULE ORAL at 21:23

## 2019-01-28 RX ADMIN — Medication 50 MILLIGRAM(S): at 13:20

## 2019-01-28 RX ADMIN — GABAPENTIN 400 MILLIGRAM(S): 400 CAPSULE ORAL at 05:27

## 2019-01-28 NOTE — CONSULT NOTE ADULT - ASSESSMENT
1.	Rhabdomyolysis  2.	Hypertension  3.	Urinary retention  4.	Change in mental status    Continue IVF. Increase rate to 100 cc/hr. Monitor BP trend. Titrate BP meds as needed. Salt restriction.   TOV in progress. Acosta cath removed. Will follow CPK trend.   Further recommendations pending clinical course. Thank you for the courtesy of this referral.

## 2019-01-28 NOTE — DISCHARGE NOTE ADULT - OTHER SIGNIFICANT FINDINGS
EXAM:  MR SPINE CERVICAL                            PROCEDURE DATE:  01/28/2019          INTERPRETATION:  MRI cervical spine without contrast    History neck pain and right arm numbness    There is slightly exaggerated cervical lordosis without acute compression   deformity or subluxation or marrow infiltration or edema. There is mild   chronic wedge compression of T3.the Spinal cord is normal in signal.    The C2-3 and C3-4 levels are normal    Disc height is preserved at C4-5. There is a tiny extruded central disc   fragment with inferior migration. It Slightly deforms the ventral spinal   cord without pedro luis compression. There is mild dorsal ligamentous   hypertrophy at C5 contributing to overall mild spinal stenosis    At C5-6 there is mild degenerative loss of disc height and mild dorsal   osteophyte encroaching on and mildly deforming the ventral spinal cord.   There is uncinate hypertrophy resulting in severe left and mild right   foraminal stenosis.    At C6-7 disc height is preserved. There is mild diffuse degenerative   dorsal disc bulging encroaching on but not significantly displacing or   deforming the spinal cord. Uncinate hypertrophy results in moderate   bilateral foraminal stenosis    C7-T1 level is within normal limits.    IMPRESSION:    Mild spinal cord impingement secondary to tiny extruded disc at C4-5 and   mild spondylosis at C5-6..      EXAM:  MR BRAIN                            PROCEDURE DATE:  01/28/2019          INTERPRETATION:  MRI brain without contrast    History syncope    Comparison CT performed 2 days prior    There is no mass effect, cortical edema or hydrocephalus. Cortical volume   and white matter signal are preserved. The orbital and sellar contents   and cerebellar tonsils are within normal limits. There is no evidence of   acute infarct or previous parenchymal hemorrhage.    IMPRESSION:    Normal brain

## 2019-01-28 NOTE — DISCHARGE NOTE ADULT - MEDICATION SUMMARY - MEDICATIONS TO CHANGE
I will SWITCH the dose or number of times a day I take the medications listed below when I get home from the hospital:    oxyCODONE 15 mg oral tablet  -- 1 tab(s) by mouth every 6 hours

## 2019-01-28 NOTE — PROGRESS NOTE ADULT - SUBJECTIVE AND OBJECTIVE BOX
Neurology follow up note    JENNIFFER MAHAN58yMale      Interval History:    Patient feels ok no new complaints less muscle aches     MEDICATIONS    acetaminophen   Tablet .. 650 milliGRAM(s) Oral every 6 hours PRN  enoxaparin Injectable 40 milliGRAM(s) SubCutaneous every 24 hours  gabapentin 400 milliGRAM(s) Oral three times a day  hydrOXYzine hydrochloride 25 milliGRAM(s) Oral at bedtime PRN  metoprolol tartrate 50 milliGRAM(s) Oral three times a day  ondansetron   Disintegrating Tablet 4 milliGRAM(s) Oral three times a day PRN  oxyCODONE    5 mG/acetaminophen 325 mG 1 Tablet(s) Oral every 4 hours PRN  pantoprazole    Tablet 40 milliGRAM(s) Oral before breakfast  potassium phosphate / sodium phosphate powder 2 Packet(s) Oral once  sodium chloride 0.9%. 1000 milliLiter(s) IV Continuous <Continuous>  tamsulosin 0.4 milliGRAM(s) Oral at bedtime      Allergies    clams (Stomach Upset)  prednisoLONE (Unknown)    Intolerances            Vital Signs Last 24 Hrs  T(C): 36.8 (28 Jan 2019 04:51), Max: 36.8 (28 Jan 2019 04:51)  T(F): 98.2 (28 Jan 2019 04:51), Max: 98.2 (28 Jan 2019 04:51)  HR: 96 (28 Jan 2019 04:51) (74 - 96)  BP: 115/72 (28 Jan 2019 04:51) (96/62 - 118/72)  BP(mean): --  RR: 16 (28 Jan 2019 04:51) (16 - 17)  SpO2: 97% (28 Jan 2019 04:51) (97% - 97%)      REVIEW OF SYSTEMS:  Constitutional: No fever, chills, fatigue, weakness  Eyes: no eye pain, visual disturbances, or discharge  ENT:  No difficulty hearing, tinnitus, vertigo; No sinus or throat pain  Neck: No pain or stiffness  Respiratory: No cough, dyspnea, wheezing   Cardiovascular: No chest pain, palpitations,   Gastrointestinal: No abdominal or epigastric pain. No nausea, vomiting  No diarrhea or constipation.   Genitourinary: No dysuria, frequency, hematuria or incontinence  Neurological: No headaches, lightheadedness, vertigo, numbness or tremors  Psychiatric: No depression, anxiety, mood swings or difficulty sleeping  Musculoskeletal: history of  extremity pain  Skin: No itching, burning, rashes or lesions   Lymph Nodes: No enlarged glands  Endocrine: No heat or cold intolerance; No hair loss   Allergy and Immunologic: No hives or eczema    On Neurological Examination:      The patient awake, alert, and oriented x3.   Extraocular movements were intact.    Pupils equal, round, and reactive bilaterally 3 mm to 2 mm.    Speech was fluent.    Smile symmetric.    Motor:  Bilateral upper and lower 4/5.    ensory:  Bilateral upper and lower intact to light touch.    Follow simple commands    GENERAL Exam: Nontoxic , No Acute Distress   	  HEENT:  normocephalic, atraumatic  		  LUNGS: Clear bilaterally   	  HEART: Normal S1S2   No murmur RRR        	  GI/ ABDOMEN:  Soft  Non tender    EXTREMITIES:   No Edema  No Clubbing  No Cyanosis     MUSCULOSKELETAL: decreased Range of Motion all 4 extremities   	   SKIN: Normal  No Ecchymosis               LABS:  CBC Full  -  ( 28 Jan 2019 08:43 )  WBC Count : 7.39 K/uL  Hemoglobin : 12.9 g/dL  Hematocrit : 38.7 %  Platelet Count - Automated : 161 K/uL  Mean Cell Volume : 90.8 fl  Mean Cell Hemoglobin : 30.3 pg  Mean Cell Hemoglobin Concentration : 33.3 gm/dL  Auto Neutrophil # : x  Auto Lymphocyte # : x  Auto Monocyte # : x  Auto Eosinophil # : x  Auto Basophil # : x  Auto Neutrophil % : x  Auto Lymphocyte % : x  Auto Monocyte % : x  Auto Eosinophil % : x  Auto Basophil % : x      01-28    139  |  105  |  13  ----------------------------<  88  3.7   |  26  |  0.70    Ca    8.3<L>      28 Jan 2019 08:43  Phos  2.1     01-28  Mg     2.0     01-28      Hemoglobin A1C:       Vitamin B12         RADIOLOGY    ANALYSIS AND PLAN:  This is a 58-year-old with a history of migraines, radiculopathy, and episode of unresponsiveness.  1.	For episode of unresponsiveness, at present unclear etiology, but as per my conversation with brother and the patient, it appears to be a very prolonged period of time, and as per my conversation with brother, he does not feel that he take an extra medication. The patient has not been sleeping for the last three days, questionable the lack of sleep could have lowered the patient's seizure threshold leading to a seizure event.  The patient was in the postictal state.  2.	EEG was normal   3.	Ativan 1 mg IV push for any type of breakthrough seizures.  4.	CK levels noted   5.	For history of migraines, at present no headache, supportive therapy Tylenol as needed.  6.	For history of fibromyalgia, pain medications as needed.  7.	Monitor oral intake.  8.	I spoke with the patient and brother in great detail.  Brother's name is Tino at [phone] 1/28/19  9.	NO AED needed at present I had a lengthy discussion with spouse and the patient in regards to driving, that he should not be driving for one year, that he is to avoid heights, not to go swimming unattended.    Thank you for the courtesy of this consultation.    Physical therapy evaluation as tolerated  OOB to chair/ambulation with assistance only if possible.    Greater than 45 minutes spent in direct patient care reviewing  the notes, lab data/ imaging , discussion with multidisciplinary team. Neurology follow up note    JENNIFFER MAHAN58yMale      Interval History:    Patient feels ok no new complaints less muscle aches     MEDICATIONS    acetaminophen   Tablet .. 650 milliGRAM(s) Oral every 6 hours PRN  enoxaparin Injectable 40 milliGRAM(s) SubCutaneous every 24 hours  gabapentin 400 milliGRAM(s) Oral three times a day  hydrOXYzine hydrochloride 25 milliGRAM(s) Oral at bedtime PRN  metoprolol tartrate 50 milliGRAM(s) Oral three times a day  ondansetron   Disintegrating Tablet 4 milliGRAM(s) Oral three times a day PRN  oxyCODONE    5 mG/acetaminophen 325 mG 1 Tablet(s) Oral every 4 hours PRN  pantoprazole    Tablet 40 milliGRAM(s) Oral before breakfast  potassium phosphate / sodium phosphate powder 2 Packet(s) Oral once  sodium chloride 0.9%. 1000 milliLiter(s) IV Continuous <Continuous>  tamsulosin 0.4 milliGRAM(s) Oral at bedtime      Allergies    clams (Stomach Upset)  prednisoLONE (Unknown)    Intolerances            Vital Signs Last 24 Hrs  T(C): 36.8 (28 Jan 2019 04:51), Max: 36.8 (28 Jan 2019 04:51)  T(F): 98.2 (28 Jan 2019 04:51), Max: 98.2 (28 Jan 2019 04:51)  HR: 96 (28 Jan 2019 04:51) (74 - 96)  BP: 115/72 (28 Jan 2019 04:51) (96/62 - 118/72)  BP(mean): --  RR: 16 (28 Jan 2019 04:51) (16 - 17)  SpO2: 97% (28 Jan 2019 04:51) (97% - 97%)      REVIEW OF SYSTEMS:  Constitutional: No fever, chills, fatigue, weakness  Eyes: no eye pain, visual disturbances, or discharge  ENT:  No difficulty hearing, tinnitus, vertigo; No sinus or throat pain  Neck: No pain or stiffness  Respiratory: No cough, dyspnea, wheezing   Cardiovascular: No chest pain, palpitations,   Gastrointestinal: No abdominal or epigastric pain. No nausea, vomiting  No diarrhea or constipation.   Genitourinary: No dysuria, frequency, hematuria or incontinence  Neurological: No headaches, lightheadedness, vertigo, numbness or tremors  Psychiatric: No depression, anxiety, mood swings or difficulty sleeping  Musculoskeletal: history of  extremity pain  Skin: No itching, burning, rashes or lesions   Lymph Nodes: No enlarged glands  Endocrine: No heat or cold intolerance; No hair loss   Allergy and Immunologic: No hives or eczema    On Neurological Examination:      The patient awake, alert, and oriented x3.   Extraocular movements were intact.    Pupils equal, round, and reactive bilaterally 3 mm to 2 mm.    Speech was fluent.    Smile symmetric.    Motor:  Bilateral upper and lower 4/5.    ensory:  Bilateral upper and lower intact to light touch.    Follow simple commands    GENERAL Exam: Nontoxic , No Acute Distress   	  HEENT:  normocephalic, atraumatic  		  LUNGS: Clear bilaterally   	  HEART: Normal S1S2   No murmur RRR        	  GI/ ABDOMEN:  Soft  Non tender    EXTREMITIES:   No Edema  No Clubbing  No Cyanosis     MUSCULOSKELETAL: decreased Range of Motion all 4 extremities   	   SKIN: Normal  No Ecchymosis               LABS:  CBC Full  -  ( 28 Jan 2019 08:43 )  WBC Count : 7.39 K/uL  Hemoglobin : 12.9 g/dL  Hematocrit : 38.7 %  Platelet Count - Automated : 161 K/uL  Mean Cell Volume : 90.8 fl  Mean Cell Hemoglobin : 30.3 pg  Mean Cell Hemoglobin Concentration : 33.3 gm/dL  Auto Neutrophil # : x  Auto Lymphocyte # : x  Auto Monocyte # : x  Auto Eosinophil # : x  Auto Basophil # : x  Auto Neutrophil % : x  Auto Lymphocyte % : x  Auto Monocyte % : x  Auto Eosinophil % : x  Auto Basophil % : x      01-28    139  |  105  |  13  ----------------------------<  88  3.7   |  26  |  0.70    Ca    8.3<L>      28 Jan 2019 08:43  Phos  2.1     01-28  Mg     2.0     01-28      Hemoglobin A1C:       Vitamin B12         RADIOLOGY    ANALYSIS AND PLAN:  This is a 58-year-old with a history of migraines, radiculopathy, and episode of unresponsiveness.  1.	For episode of unresponsiveness, at present unclear etiology, but as per my conversation with brother and the patient, it appears to be a very prolonged period of time, and as per my conversation with brother, he does not feel that he take an extra medication. The patient has not been sleeping for the last three days, questionable the lack of sleep could have lowered the patient's seizure threshold leading to a seizure event.  The patient was in the postictal state.  2.	EEG was normal   3.	Ativan 1 mg IV push for any type of breakthrough seizures.  4.	CK levels noted IVF as needed   5.	For history of migraines, at present no headache, supportive therapy Tylenol as needed.  6.	For history of fibromyalgia, pain medications as needed.  7.	Monitor oral intake.  8.	I spoke with the patient and brother in great detail.  Brother's name is Tino at [phone] 1/28/19  9.	NO AED needed at present I had a lengthy discussion with spouse and the patient in regards to driving, that he should not be driving for one year, that he is to avoid heights, not to go swimming unattended.    Thank you for the courtesy of this consultation.    Physical therapy evaluation as tolerated  OOB to chair/ambulation with assistance only if possible.    Greater than 45 minutes spent in direct patient care reviewing  the notes, lab data/ imaging , discussion with multidisciplinary team.

## 2019-01-28 NOTE — DISCHARGE NOTE ADULT - MEDICATION SUMMARY - MEDICATIONS TO TAKE
I will START or STAY ON the medications listed below when I get home from the hospital:    gabapentin 400 mg oral tablet  -- 1 tab(s) by mouth 3 times a day  -- Indication: For Arrhythmia    Zofran ODT 4 mg oral tablet, disintegrating  -- 1 tab(s) by mouth 3 times a day, As Needed  -- Indication: For Nausea    hydrOXYzine hydrochloride 25 mg oral tablet  -- 1 tab(s) by mouth 4 times a day, As Needed  -- Indication: For Fibromyalgia    doxepin 50 mg oral capsule  -- 1 cap(s) by mouth 3 times a day  -- Indication: For Fibromyalgia    metoprolol tartrate 50 mg oral tablet  -- 1 tab(s) by mouth 3 times a day  -- Indication: For Palpitations    AcipHex 20 mg oral delayed release tablet  -- 1 tab(s) by mouth once a day, As Needed  -- Indication: For GERD (gastroesophageal reflux disease) I will START or STAY ON the medications listed below when I get home from the hospital:    oxyCODONE 5 mg oral tablet  -- 1 tab(s) by mouth every 4 hours, As needed, Severe Pain (7 - 10)  -- Indication: For Herniated cervical disc    gabapentin 400 mg oral tablet  -- 1 tab(s) by mouth 3 times a day  -- Indication: For Arrhythmia    Zofran ODT 4 mg oral tablet, disintegrating  -- 1 tab(s) by mouth 3 times a day, As Needed  -- Indication: For Nausea    hydrOXYzine hydrochloride 25 mg oral tablet  -- 1 tab(s) by mouth 4 times a day, As Needed  -- Indication: For Fibromyalgia    doxepin 50 mg oral capsule  -- 1 cap(s) by mouth 3 times a day  -- Indication: For Fibromyalgia    metoprolol tartrate 50 mg oral tablet  -- 1 tab(s) by mouth 3 times a day  -- Indication: For Palpitations    AcipHex 20 mg oral delayed release tablet  -- 1 tab(s) by mouth once a day, As Needed  -- Indication: For GERD (gastroesophageal reflux disease) I will START or STAY ON the medications listed below when I get home from the hospital:    oxyCODONE 15 mg oral tablet, extended release  -- 1 tab(s) by mouth every 12 hours, As Needed - 10)  Hold for Lethargy, sedation  -- Indication: For Herniated cervical disc    gabapentin 400 mg oral tablet  -- 1 tab(s) by mouth 3 times a day  -- Indication: For Arrhythmia    Zofran ODT 4 mg oral tablet, disintegrating  -- 1 tab(s) by mouth 3 times a day, As Needed  -- Indication: For Nausea    hydrOXYzine hydrochloride 25 mg oral tablet  -- 1 tab(s) by mouth 4 times a day, As Needed  -- Indication: For Fibromyalgia    doxepin 50 mg oral capsule  -- 1 cap(s) by mouth 3 times a day  -- Indication: For Fibromyalgia    metoprolol tartrate 50 mg oral tablet  -- 1 tab(s) by mouth 3 times a day  -- Indication: For Palpitations    AcipHex 20 mg oral delayed release tablet  -- 1 tab(s) by mouth once a day, As Needed  -- Indication: For GERD (gastroesophageal reflux disease) I will START or STAY ON the medications listed below when I get home from the hospital:    oxyCODONE 15 mg oral tablet, extended release  -- 1 tab(s) by mouth every 12 hours, As Needed for severe pain (7- 10)  Hold for Lethargy, sedation  -- Indication: For Herniated cervical disc    gabapentin 400 mg oral tablet  -- 1 tab(s) by mouth 3 times a day  -- Indication: For Arrhythmia    Zofran ODT 4 mg oral tablet, disintegrating  -- 1 tab(s) by mouth 3 times a day, As Needed  -- Indication: For Nausea    hydrOXYzine hydrochloride 25 mg oral tablet  -- 1 tab(s) by mouth 4 times a day, As Needed  -- Indication: For Fibromyalgia    doxepin 50 mg oral capsule  -- 1 cap(s) by mouth 3 times a day  -- Indication: For Fibromyalgia    metoprolol tartrate 50 mg oral tablet  -- 1 tab(s) by mouth 3 times a day  -- Indication: For Palpitations    AcipHex 20 mg oral delayed release tablet  -- 1 tab(s) by mouth once a day, As Needed  -- Indication: For GERD (gastroesophageal reflux disease)

## 2019-01-28 NOTE — DISCHARGE NOTE ADULT - HOSPITAL COURSE
57 yo M with PMH of Cervical Disc Disease, Fibromyalgia, ?Arrhythmia, Hypothyroidism, Anxiety, Migraines, GERD, Insomnia, presented to the ED after being found moaning on the bathroom floor by his brother. History confirmed with patient, who is not the best historian, and chart review. Call placed to patient's brother, but could not be reached. Patient was previously seen sleeping in a chair at home around 22:00 on night of admission. Per his brother, he had appeared at his baseline mental status earlier in the day. His brother also stated that he had not slept for the past 3 days despite taking Benadryl, which he uses chronically for insomnia; last dose taken on evening of admission. Of note, patient has chronic pain due to his Cervical Disc Disease, Fibromyalgia, and takes Oxycodone 15mg PO q6h prn pain, and reported being compliant with this dose. Per chart review, he appeared extremely lethargic and was noted to be hypoventilating upon presentation to the ED. He was emergently given Narcan 0.4mg IV x1, after which his alertness improved, although his brother stated that he was not quite at his baseline mental status. According to chart review, patient returned to his baseline mental status at approximately 02:30. Currently, he admitted to chronic pain and fatigue with no new pain, as well as a chronic itchy rash on his face, UE bilaterally. He also stated that he had no recollection of the events leading up to his arrival in [place] ED. No other acute complaints at this time.    Of note, he experienced a similar episode in 8/2018, which resolved without any intervention.    In the ED, he was initially hypotensive (BP 93/64), otherwise hemodynamically stable.  NIH Stroke Scale score 0.  EKG: NSR@100bpm, nonspecific ST and T wave abnormalities.  Labs: Elevated BUN (32), elevated Alk Phos (151); no leukocytosis, no anemia, no electrolyte abnormalities, normal Cr, normal eGFR, troponin negative x1.   Blood alcohol, salicylate, acetaminophen levels all wnl.  Urine Toxicology pending.  CXR: Preliminary read, negative for infiltrates, consolidations, and effusions.  Xray Cervical Spine: Preliminary read, negative for acute fracture.  CT Head: No acute intracranial hemorrhage, mass effect, or CT evidence of an acute vascular territorial infarct.  Received Naloxone 0.4mg IV x1, NS Bolus 1L x1.    Patient was admitted to telemetry and had an EEG which was not revealing of seizures. He had a complaint of palpitations and was seen by Cardiology Dr. Sorensen who recommended EKG, echo, cardiac enzymes and continuing metoprolol. Cardiac enzymes were negative and EKG was not significant for ischemic changes. Echo was done which showed...... Patient developed urinary retention on day 1 and had a Acosta inserted. Diphenhydramine was stopped and patient was started on tamsulosin. Acosta was removed the next day with a successful trial of void. Patient showed significant improvement overall and had no further syncopal episodes. Patient was stable for discharge with outpatient neurology, cardio and pain management follow up within 1 week. 59 yo M with PMH of Cervical Disc Disease, Fibromyalgia, ?Arrhythmia, Hypothyroidism, Anxiety, Migraines, GERD, Insomnia, presented to the ED after being found moaning on the bathroom floor by his brother. History confirmed with patient, who is not the best historian, and chart review. Call placed to patient's brother, but could not be reached. Patient was previously seen sleeping in a chair at home around 22:00 on night of admission. Per his brother, he had appeared at his baseline mental status earlier in the day. His brother also stated that he had not slept for the past 3 days despite taking Benadryl, which he uses chronically for insomnia; last dose taken on evening of admission. Of note, patient has chronic pain due to his Cervical Disc Disease, Fibromyalgia, and takes Oxycodone 15mg PO q6h prn pain, and reported being compliant with this dose. Per chart review, he appeared extremely lethargic and was noted to be hypoventilating upon presentation to the ED. He was emergently given Narcan 0.4mg IV x1, after which his alertness improved, although his brother stated that he was not quite at his baseline mental status. According to chart review, patient returned to his baseline mental status at approximately 02:30. Currently, he admitted to chronic pain and fatigue with no new pain, as well as a chronic itchy rash on his face, UE bilaterally. He also stated that he had no recollection of the events leading up to his arrival in [place] ED. No other acute complaints at this time.    Of note, he experienced a similar episode in 8/2018, which resolved without any intervention.    In the ED, he was initially hypotensive (BP 93/64), otherwise hemodynamically stable.  NIH Stroke Scale score 0.  EKG: NSR@100bpm, nonspecific ST and T wave abnormalities.  Labs: Elevated BUN (32), elevated Alk Phos (151); no leukocytosis, no anemia, no electrolyte abnormalities, normal Cr, normal eGFR, troponin negative x1.   Blood alcohol, salicylate, acetaminophen levels all wnl.  Urine Toxicology pending.  CXR: Preliminary read, negative for infiltrates, consolidations, and effusions.  Xray Cervical Spine: Preliminary read, negative for acute fracture.  CT Head: No acute intracranial hemorrhage, mass effect, or CT evidence of an acute vascular territorial infarct.  Received Naloxone 0.4mg IV x1, NS Bolus 1L x1.    Patient was admitted to telemetry and had an EEG which was not revealing of seizures. He had a complaint of palpitations and was seen by Cardiology Dr. Sorensen who recommended EKG, echo, cardiac enzymes and continuing metoprolol. Cardiac enzymes were negative and EKG was not significant for ischemic changes. Echo was done which showed normal LV systolic function EF estimated 65%. Patient developed urinary retention on day 1 and had a Acosta inserted. Diphenhydramine was stopped and patient was started on tamsulosin. Acosta was removed the next day with a successful trial of void. Patient was also found to have elevated CPK and was seen by nephrology. He was started on aggressive hydration with eventual improvement in levels. Patient showed significant improvement overall and had no further syncopal episodes. Patient was stable for discharge with outpatient neurology, cardio and pain management follow up within 1 week. 59 yo M with PMH of Cervical Disc Disease, Fibromyalgia, ?Arrhythmia, Hypothyroidism, Anxiety, Migraines, GERD, Insomnia, presented to the ED after being found moaning on the bathroom floor by his brother. History confirmed with patient, who is not the best historian, and chart review. Call placed to patient's brother, but could not be reached. Patient was previously seen sleeping in a chair at home around 22:00 on night of admission. Per his brother, he had appeared at his baseline mental status earlier in the day. His brother also stated that he had not slept for the past 3 days despite taking Benadryl, which he uses chronically for insomnia; last dose taken on evening of admission. Of note, patient has chronic pain due to his Cervical Disc Disease, Fibromyalgia, and takes Oxycodone 15mg PO q6h prn pain, and reported being compliant with this dose. Per chart review, he appeared extremely lethargic and was noted to be hypoventilating upon presentation to the ED. He was emergently given Narcan 0.4mg IV x1, after which his alertness improved, although his brother stated that he was not quite at his baseline mental status. According to chart review, patient returned to his baseline mental status at approximately 02:30. Currently, he admitted to chronic pain and fatigue with no new pain, as well as a chronic itchy rash on his face, UE bilaterally. He also stated that he had no recollection of the events leading up to his arrival in [place] ED. No other acute complaints at this time.    Of note, he experienced a similar episode in 8/2018, which resolved without any intervention.    In the ED, he was initially hypotensive (BP 93/64), otherwise hemodynamically stable.  NIH Stroke Scale score 0.  EKG: NSR@100bpm, nonspecific ST and T wave abnormalities.  Labs: Elevated BUN (32), elevated Alk Phos (151); no leukocytosis, no anemia, no electrolyte abnormalities, normal Cr, normal eGFR, troponin negative x1.   Blood alcohol, salicylate, acetaminophen levels all wnl.  Urine Toxicology pending.  CXR: Preliminary read, negative for infiltrates, consolidations, and effusions.  Xray Cervical Spine: Preliminary read, negative for acute fracture.  CT Head: No acute intracranial hemorrhage, mass effect, or CT evidence of an acute vascular territorial infarct.  Received Naloxone 0.4mg IV x1, NS Bolus 1L x1.    Patient was admitted to telemetry and had an EEG which was not revealing of seizures. He had a complaint of palpitations and was seen by Cardiology Dr. Sorensen who recommended EKG, echo, cardiac enzymes and continuing metoprolol. Cardiac enzymes were negative and EKG was not significant for ischemic changes. Echo was done which showed normal LV systolic function EF estimated 65%. Patient developed urinary retention on day 1 and had a Acosta inserted. Diphenhydramine was stopped and patient was started on tamsulosin.     1/28/19 Acosta was removed the with a successful trial of void. Patient was also found to have elevated CPK and was seen by nephrology. He was started on aggressive hydration with eventual improvement in levels. Patient was evaluated by psychiatry Dr. Santiago who noted.....  Patient notes that he had a history of right hand numbness, weakness and ? loss of function in December which self resolved after approximately 1 month. He was recommended by neurology to have f/u MRI head and cervical spine which showed...     Patient showed improvement overall and had no further syncopal episodes. Patient was stable for discharge with outpatient neurology, cardio and pain management follow up within 1 week.    ---  CONSULTANTS:   Dr. Hogan- Nephrology  Dr. Andrew- Neurology  Dr. Santiago- Psychiatry  Michael Saucedo- Cardiology  ---  TIME SPENT:  The total amount of time spent reviewing the hospital notes, laboratory values, imaging findings, assessing/counseling the patient, discussing with consultant physicians, social work, nursing staff took -- minutes    ---  FINAL DISCHARGE DIAGNOSIS LIST:  Please see last daily progress note for final discharge diagnoses    --- 59 yo M with PMH of Cervical Disc Disease, Fibromyalgia, ?Arrhythmia, Hypothyroidism, Anxiety, Migraines, GERD, Insomnia, presented to the ED after being found moaning on the bathroom floor by his brother. History confirmed with patient, who is not the best historian, and chart review. Call placed to patient's brother, but could not be reached. Patient was previously seen sleeping in a chair at home around 22:00 on night of admission. Per his brother, he had appeared at his baseline mental status earlier in the day. His brother also stated that he had not slept for the past 3 days despite taking Benadryl, which he uses chronically for insomnia; last dose taken on evening of admission. Of note, patient has chronic pain due to his Cervical Disc Disease, Fibromyalgia, and takes Oxycodone 15mg PO q6h prn pain, and reported being compliant with this dose. Per chart review, he appeared extremely lethargic and was noted to be hypoventilating upon presentation to the ED. He was emergently given Narcan 0.4mg IV x1, after which his alertness improved, although his brother stated that he was not quite at his baseline mental status. According to chart review, patient returned to his baseline mental status at approximately 02:30. Currently, he admitted to chronic pain and fatigue with no new pain, as well as a chronic itchy rash on his face, UE bilaterally. He also stated that he had no recollection of the events leading up to his arrival in [place] ED. No other acute complaints at this time.    Of note, he experienced a similar episode in 8/2018, which resolved without any intervention.    In the ED, he was initially hypotensive (BP 93/64), otherwise hemodynamically stable.  NIH Stroke Scale score 0.  EKG: NSR@100bpm, nonspecific ST and T wave abnormalities.  Labs: Elevated BUN (32), elevated Alk Phos (151); no leukocytosis, no anemia, no electrolyte abnormalities, normal Cr, normal eGFR, troponin negative x1.   Blood alcohol, salicylate, acetaminophen levels all wnl.  Urine Toxicology pending.  CXR: Preliminary read, negative for infiltrates, consolidations, and effusions.  Xray Cervical Spine: Preliminary read, negative for acute fracture.  CT Head: No acute intracranial hemorrhage, mass effect, or CT evidence of an acute vascular territorial infarct.  Received Naloxone 0.4mg IV x1, NS Bolus 1L x1.    Patient was admitted to telemetry and had an EEG which was not revealing of seizures. He had a complaint of palpitations and was seen by Cardiology Dr. Sorensen who recommended EKG, echo, cardiac enzymes and continuing metoprolol. Cardiac enzymes were negative and EKG was not significant for ischemic changes. Echo was done which showed normal LV systolic function EF estimated 65%. Patient developed urinary retention on day 1 and had a Acosta inserted. Diphenhydramine was stopped and patient was started on tamsulosin.     1/28/19 Acosta was removed the with a successful trial of void. Patient was also found to have elevated CPK and was seen by nephrology. He was started on aggressive hydration. Patient was evaluated by psychiatry Dr. Santiago who noted patient has a long standing history of opiate use.   Patient notes that he had a history of right hand numbness, weakness and ? loss of function in December which self resolved after approximately 1 month. He was recommended by neurology to have f/u MRI head and cervical spine which showed no acute intracranial changes and mild spinal cord impingement secondary to tiny extruded disc at C4-5 and mild spondylosis at C5-6.    1/29/19 CPK levels improved,  feeling better. No acute complaints.     Patient showed improvement overall and had no further syncopal episodes. Patient was stable for discharge with outpatient neurology, cardio and pain management follow up within 1 week.    ---  CONSULTANTS:   Dr. Hogan- Nephrology  Dr. Andrew- Neurology  Dr. Santiago- Psychiatry  Michael Saucedo- Cardiology  ---  FINAL DISCHARGE DIAGNOSIS LIST:  Please see last daily progress note for final discharge diagnoses    --- 59 yo M with PMH of Cervical Disc Disease, Fibromyalgia, ?Arrhythmia, Hypothyroidism, Anxiety, Migraines, GERD, Insomnia, presented to the ED after being found moaning on the bathroom floor by his brother. History confirmed with patient, who is not the best historian, and chart review. Call placed to patient's brother, but could not be reached. Patient was previously seen sleeping in a chair at home around 22:00 on night of admission. Per his brother, he had appeared at his baseline mental status earlier in the day. His brother also stated that he had not slept for the past 3 days despite taking Benadryl, which he uses chronically for insomnia; last dose taken on evening of admission. Of note, patient has chronic pain due to his Cervical Disc Disease, Fibromyalgia, and takes Oxycodone 15mg PO q6h prn pain, and reported being compliant with this dose. Per chart review, he appeared extremely lethargic and was noted to be hypoventilating upon presentation to the ED. He was emergently given Narcan 0.4mg IV x1, after which his alertness improved, although his brother stated that he was not quite at his baseline mental status. According to chart review, patient returned to his baseline mental status at approximately 02:30. Currently, he admitted to chronic pain and fatigue with no new pain, as well as a chronic itchy rash on his face, UE bilaterally. He also stated that he had no recollection of the events leading up to his arrival in [place] ED. No other acute complaints at this time.    Of note, he experienced a similar episode in 8/2018, which resolved without any intervention.    In the ED, he was initially hypotensive (BP 93/64), otherwise hemodynamically stable.  NIH Stroke Scale score 0.  EKG: NSR@100bpm, nonspecific ST and T wave abnormalities.  Labs: Elevated BUN (32), elevated Alk Phos (151); no leukocytosis, no anemia, no electrolyte abnormalities, normal Cr, normal eGFR, troponin negative x1.   Blood alcohol, salicylate, acetaminophen levels all wnl.  Urine Toxicology pending.  CXR: Preliminary read, negative for infiltrates, consolidations, and effusions.  Xray Cervical Spine: Preliminary read, negative for acute fracture.  CT Head: No acute intracranial hemorrhage, mass effect, or CT evidence of an acute vascular territorial infarct.  Received Naloxone 0.4mg IV x1, NS Bolus 1L x1.    Patient was admitted to telemetry and had an EEG which was not revealing of seizures. He had a complaint of palpitations and was seen by Cardiology Dr. Sorensen who recommended EKG, echo, cardiac enzymes and continuing metoprolol. Cardiac enzymes were negative and EKG was not significant for ischemic changes. Echo was done which showed normal LV systolic function EF estimated 65%. Patient developed urinary retention on day 1 and had a Acosta inserted. Diphenhydramine was stopped and patient was started on tamsulosin.     1/28/19 Acosta was removed the with a successful trial of void. Patient was also found to have elevated CPK and was seen by nephrology. He was started on IVF at 100cc/hr. Patient was evaluated by psychiatry Dr. Santiago who noted patient has a long standing history of opiate use and no psychiatric contraindications to discharge.  Patient notes that he had a history of right hand numbness, weakness and ? loss of function in December which self resolved after approximately 1 month. He was recommended by neurology to have f/u MRI head and cervical spine which showed no acute intracranial changes and mild spinal cord impingement secondary to tiny extruded disc at C4-5 and mild spondylosis at C5-6.    1/29/19 CPK levels improved,  feeling better. No acute complaints.     Patient showed improvement overall and had no further syncopal episodes. Patient was stable for discharge with outpatient neurology, cardio and pain management follow up within 1 week.      Vital Signs Last 24 Hrs  T(C): 36.7 (29 Jan 2019 07:24), Max: 36.9 (28 Jan 2019 15:36)  T(F): 98 (29 Jan 2019 07:24), Max: 98.5 (28 Jan 2019 23:15)  HR: 71 (29 Jan 2019 07:24) (71 - 93)  BP: 107/66 (29 Jan 2019 07:24) (104/67 - 120/70)  BP(mean): --  RR: 16 (29 Jan 2019 07:24) (15 - 17)  SpO2: 99% (29 Jan 2019 07:24) (95% - 99%)      PHYSICAL EXAM:  GENERAL: NAD, well-groomed, well-developed  HEAD:  Atraumatic, Normocephalic  EYES: EOMI, PERRLA, conjunctiva and sclera clear  ENMT: No tonsillar erythema, exudates, or enlargement; Moist mucous membranes, Poor dentition, No lesions  NECK: Supple, No JVD  NERVOUS SYSTEM:  Alert & Oriented X3, Good concentration; Motor Strength 5/5 B/L upper and lower extremities  CHEST/LUNG: Clear to auscultation bilaterally; No rales, rhonchi, wheezing, or rubs  HEART: Regular rate and rhythm; No murmurs, rubs, or gallops  ABDOMEN: Soft, Nontender, Nondistended; Bowel sounds present  EXTREMITIES:  2+ Peripheral Pulses, No clubbing, cyanosis, or edema  LYMPH: No lymphadenopathy noted  SKIN: + healing erythematous lesions on face and b/l UE no drainage    ---  CONSULTANTS:   Dr. Hogan- Nephrology  Dr. Andrew- Neurology  Dr. Santiago- Psychiatry  Dr. Cast, Michael Sorensen- Cardiology  ---  FINAL DISCHARGE DIAGNOSIS LIST:  Please see last daily progress note for final discharge diagnoses    ---

## 2019-01-28 NOTE — DISCHARGE NOTE ADULT - PATIENT PORTAL LINK FT
You can access the [facility], offered by [facility], by registering with the following website: [email or web address]

## 2019-01-28 NOTE — CONSULT NOTE ADULT - SUBJECTIVE AND OBJECTIVE BOX
Patient is a 58y old  Male who presents with a chief complaint of Syncope, AMS (2019 10:41)    HPI:  59 yo M with PMH of Cervical Disc Disease, Fibromyalgia, ?Arrhythmia, Hypothyroidism, Anxiety, Migraines, GERD, Insomnia, presented to the ED after being found moaning on the bathroom floor by his brother. History confirmed with patient, who is not the best historian, and chart review. Call placed to patient's brother, but could not be reached. Patient was previously seen sleeping in a chair at home around 22:00 on night of admission. Per his brother, he had appeared at his baseline mental status earlier in the day. His brother also stated that he had not slept for the past 3 days despite taking Benadryl, which he uses chronically for insomnia; last dose taken on evening of admission. Of note, patient has chronic pain due to his Cervical Disc Disease, Fibromyalgia, and takes Oxycodone 15mg PO q6h prn pain, and reported being compliant with this dose. Per chart review, he appeared extremely lethargic and was noted to be hypoventilating upon presentation to the ED. He was emergently given Narcan 0.4mg IV x1, after which his alertness improved, although his brother stated that he was not quite at his baseline mental status. According to chart review, patient returned to his baseline mental status at approximately 02:30. Currently, he admitted to chronic pain and fatigue with no new pain, as well as a chronic itchy rash on his face, UE bilaterally. He also stated that he had no recollection of the events leading up to his arrival in [facility] ED. No other acute complaints at this time.    Of note, he experienced a similar episode in 2018, which resolved without any intervention.    In the ED, he was initially hypotensive (BP 93/64), otherwise hemodynamically stable.  NIH Stroke Scale score 0.  EKG: NSR@100bpm, nonspecific ST and T wave abnormalities.  Labs: Elevated BUN (32), elevated Alk Phos (151); no leukocytosis, no anemia, no electrolyte abnormalities, normal Cr, normal eGFR, troponin negative x1.   Blood alcohol, salicylate, acetaminophen levels all wnl.  Urine Toxicology pending.  CXR: Preliminary read, negative for infiltrates, consolidations, and effusions.  Xray Cervical Spine: Preliminary read, negative for acute fracture.  CT Head: No acute intracranial hemorrhage, mass effect, or CT evidence of an acute vascular territorial infarct.  Received Naloxone 0.4mg IV x1, NS Bolus 1L x1. (2019 03:49)    Renal consult called for elevated CPK trend. History obtained from chart and patient.       PAST MEDICAL HISTORY:  Insomnia  GERD (gastroesophageal reflux disease)  Fibromyalgia  Hypothyroid  Herniated cervical disc  Anxiety  Radiculopathy  Headache, migraine  Back pain  HTN (hypertension)      PAST SURGICAL HISTORY:  S/P hernia repair      FAMILY HISTORY:  Family history of acute myocardial infarction (Sibling)      SOCIAL HISTORY: No smoking or alcohol use     Allergies    clams (Stomach Upset)  prednisoLONE (Unknown)    Intolerances      Home Medications:  AcipHex 20 mg oral delayed release tablet: 1 tab(s) orally once a day, As Needed (2019 04:53)  doxepin 50 mg oral capsule: 1 cap(s) orally 3 times a day (2019 04:53)  gabapentin 400 mg oral tablet: 1 tab(s) orally 3 times a day (2019 04:53)  hydrOXYzine hydrochloride 25 mg oral tablet: 1 tab(s) orally 4 times a day, As Needed (2019 04:53)  metoprolol tartrate 50 mg oral tablet: 1 tab(s) orally 3 times a day (2019 04:53)  Zofran ODT 4 mg oral tablet, disintegratin tab(s) orally 3 times a day, As Needed (2019 04:53)    MEDICATIONS  (STANDING):  enoxaparin Injectable 40 milliGRAM(s) SubCutaneous every 24 hours  gabapentin 400 milliGRAM(s) Oral three times a day  metoprolol tartrate 50 milliGRAM(s) Oral three times a day  pantoprazole    Tablet 40 milliGRAM(s) Oral before breakfast  sodium chloride 0.9%. 1000 milliLiter(s) (75 mL/Hr) IV Continuous <Continuous>  tamsulosin 0.4 milliGRAM(s) Oral at bedtime    MEDICATIONS  (PRN):  acetaminophen   Tablet .. 650 milliGRAM(s) Oral every 6 hours PRN Mild Pain (1 - 3)  hydrOXYzine hydrochloride 25 milliGRAM(s) Oral at bedtime PRN Anxiety  ondansetron   Disintegrating Tablet 4 milliGRAM(s) Oral three times a day PRN Nausea and/or Vomiting  oxyCODONE    5 mG/acetaminophen 325 mG 1 Tablet(s) Oral every 4 hours PRN Severe Pain (7 - 10)      REVIEW OF SYSTEMS:  General: wekaness  Respiratory: No cough, SOB  Cardiovascular: No CP or Palpitations	  Gastrointestinal: No nausea, Vomiting. No diarrhea  Genitourinary: No urinary complaints	  Musculoskeletal: No new rash or lesions	  all other systems negative    T(F): 98.2 (19 @ 04:51), Max: 98.2 (19 @ 04:51)  HR: 96 (19 @ 04:51) (74 - 96)  BP: 115/72 (19 @ 04:51) (96/62 - 118/72)  RR: 16 (19 @ 04:51) (16 - 17)  SpO2: 97% (19 @ 04:51) (97% - 97%)  Wt(kg): --    PHYSICAL EXAM:  General: NAD  Respiratory: b/l air entry  Cardiovascular: S1 S2  Gastrointestinal: soft  Extremities: no edema            139  |  105  |  13  ----------------------------<  88  3.7   |  26  |  0.70    Ca    8.3<L>      2019 08:43  Phos  2.1       Mg     2.0                                 12.9   7.39  )-----------( 161      ( 2019 08:43 )             38.7       Hemoglobin: 12.9 g/dL ( @ 08:43)  Hematocrit: 38.7 % ( @ 08:43)  Potassium, Serum: 3.7 mmol/L ( @ 08:43)  Blood Urea Nitrogen, Serum: 13 mg/dL ( @ 08:43)      Creatinine, Serum: 0.70 ( @ 08:43)  Creatinine, Serum: 1.10 ( @ 05:35)  Creatinine, Serum: 1.30 ( @ 08:33)  Creatinine, Serum: 1.20 ( @ 01:13)    Creatine Kinase, Serum: 4422 U/L (19 @ 14:32)          CARDIAC MARKERS ( 2019 14:32 )  x     / x     / 4422 U/L / x     / x      CARDIAC MARKERS ( 2019 02:50 )  <.015 ng/mL / x     / x     / x     / x          Creatine Kinase, Serum: 4422 U/L (19 @ 14:32)          I&O's Detail    2019 07:  -  2019 07:00  --------------------------------------------------------  IN:  Total IN: 0 mL    OUT:    Indwelling Catheter - Urethral: 1300 mL  Total OUT: 1300 mL    Total NET: -1300 mL      2019 07:  -  2019 12:31  --------------------------------------------------------  IN:    Oral Fluid: 250 mL    sodium chloride 0.9%.: 150 mL  Total IN: 400 mL    OUT:    Voided: 200 mL  Total OUT: 200 mL    Total NET: 200 mL

## 2019-01-28 NOTE — PROGRESS NOTE ADULT - PROBLEM SELECTOR PLAN 1
-Suspect secondary to medications/ polypharmacy  -Psych eval Dr Santiago pending  -EEG no seizure activity  -CT Head showed no acute intracranial hemorrhage, mass effect, or CT evidence of an acute vascular territorial infarct.  -Utox positive for opiates (on oxycodone at home)   -Blood alcohol, salicylate, acetaminophen levels all wnl.  - Pain is controlled with Tylenol and Percocet 5/325 Q4.  - Has been ambulating without assistance. No complaints of dizziness or unsteady gait.

## 2019-01-28 NOTE — PROGRESS NOTE ADULT - PROBLEM SELECTOR PLAN 7
cont iv fluids, follow cpk advance care planning and advance directives discussed  pt advised to provide health care proxy forms w appointed health care agent

## 2019-01-28 NOTE — BEHAVIORAL HEALTH ASSESSMENT NOTE - SUMMARY
Patient is a 57 y/o WM with long history of opioid dependence who is status post accidental overdose.

## 2019-01-28 NOTE — DISCHARGE NOTE ADULT - PLAN OF CARE
You were admitted with altered mental status likely secondary to medication overdose. Please continue taking oxycodone 5mg every 4 hours for severe pain only until you are seen by pain management and your neurologist. Please continue to monitor your symptoms and do not take any medication if you feel lethargic or like you are going to pass out. Please continue current medications You were previously diagnosed with GERD. This means you have an abnormally high production of acid in your stomach. Please continue to take your medications as prescribed and follow up with your PCP for further management. You were previously diagnosed with herniated cervical discs. Please follow up with your neurologist and pain management specialist within 1 week of discharge. You experienced an episode of palpitations during your hospitalization and were seen by a cardiologist Dr. Madden. You had an EKG, cardiac enzymes and echo done. Please continue taking metoprolol and follow up with you cardiologist within 1 week. Resolution You were admitted with altered mental status likely secondary to medication overdose. Please continue taking oxycodone 5mg every 4 hours as needed for severe pain only until you are seen by pain management and your neurologist. Please continue to monitor your symptoms and do not take any medication if you feel lethargic or like you are going to pass out. You were previously diagnosed with herniated cervical discs. Please continue taking oxycodone 5mg every 4 hours as needed for severe pain only until you are seen by pain management and your neurologist. Please continue to monitor your symptoms and do not take any medication if you feel lethargic or like you are going to pass out. You were found to have elevated muscle enzymes and were treated with fluids. Please drink plenty of fluids and follow up with your PCP within 3 days for follow up blood work. You were previously diagnosed with herniated cervical discs. Please continue taking oxycodone 15mg every 12 hours as needed for severe pain only until you are seen by pain management and your neurologist. Please continue to monitor your symptoms and do not take any medication if you feel lethargic or like you are going to pass out. You were admitted with altered mental status likely secondary to medication overdose. Please continue taking oxycodone 15mg every 12 hours as needed for severe pain (7-10) only until you are seen by pain management and your neurologist. Please continue to monitor your symptoms and do not take any medication if you feel lethargic or like you are going to pass out.

## 2019-01-28 NOTE — DISCHARGE NOTE ADULT - CARE PLAN
Principal Discharge DX:	Altered mental status  Assessment and plan of treatment:	You were admitted with altered mental status likely secondary to medication overdose. Please continue taking oxycodone 5mg every 4 hours for severe pain only until you are seen by pain management and your neurologist. Please continue to monitor your symptoms and do not take any medication if you feel lethargic or like you are going to pass out.  Secondary Diagnosis:	Fibromyalgia  Assessment and plan of treatment:	Please continue current medications  Secondary Diagnosis:	GERD (gastroesophageal reflux disease)  Assessment and plan of treatment:	You were previously diagnosed with GERD. This means you have an abnormally high production of acid in your stomach. Please continue to take your medications as prescribed and follow up with your PCP for further management.  Secondary Diagnosis:	Herniated cervical disc  Assessment and plan of treatment:	You were previously diagnosed with herniated cervical discs. Please follow up with your neurologist and pain management specialist within 1 week of discharge.  Secondary Diagnosis:	Palpitations  Assessment and plan of treatment:	You experienced an episode of palpitations during your hospitalization and were seen by a cardiologist Dr. Madden. You had an EKG, cardiac enzymes and echo done. Please continue taking metoprolol and follow up with you cardiologist within 1 week. Principal Discharge DX:	Altered mental status  Goal:	Resolution  Assessment and plan of treatment:	You were admitted with altered mental status likely secondary to medication overdose. Please continue taking oxycodone 5mg every 4 hours as needed for severe pain only until you are seen by pain management and your neurologist. Please continue to monitor your symptoms and do not take any medication if you feel lethargic or like you are going to pass out.  Secondary Diagnosis:	Fibromyalgia  Assessment and plan of treatment:	Please continue current medications  Secondary Diagnosis:	GERD (gastroesophageal reflux disease)  Assessment and plan of treatment:	You were previously diagnosed with GERD. This means you have an abnormally high production of acid in your stomach. Please continue to take your medications as prescribed and follow up with your PCP for further management.  Secondary Diagnosis:	Herniated cervical disc  Assessment and plan of treatment:	You were previously diagnosed with herniated cervical discs. Please continue taking oxycodone 5mg every 4 hours as needed for severe pain only until you are seen by pain management and your neurologist. Please continue to monitor your symptoms and do not take any medication if you feel lethargic or like you are going to pass out.  Secondary Diagnosis:	Palpitations  Assessment and plan of treatment:	You experienced an episode of palpitations during your hospitalization and were seen by a cardiologist Dr. Madden. You had an EKG, cardiac enzymes and echo done. Please continue taking metoprolol and follow up with you cardiologist within 1 week.  Secondary Diagnosis:	Rhabdomyolysis  Assessment and plan of treatment:	You were found to have elevated muscle enzymes and were treated with fluids. Please drink plenty of fluids and follow up with your PCP within 3 days for follow up blood work. Principal Discharge DX:	Altered mental status  Goal:	Resolution  Assessment and plan of treatment:	You were admitted with altered mental status likely secondary to medication overdose. Please continue taking oxycodone 15mg every 12 hours as needed for severe pain (7-10) only until you are seen by pain management and your neurologist. Please continue to monitor your symptoms and do not take any medication if you feel lethargic or like you are going to pass out.  Secondary Diagnosis:	Fibromyalgia  Assessment and plan of treatment:	Please continue current medications  Secondary Diagnosis:	GERD (gastroesophageal reflux disease)  Assessment and plan of treatment:	You were previously diagnosed with GERD. This means you have an abnormally high production of acid in your stomach. Please continue to take your medications as prescribed and follow up with your PCP for further management.  Secondary Diagnosis:	Herniated cervical disc  Assessment and plan of treatment:	You were previously diagnosed with herniated cervical discs. Please continue taking oxycodone 15mg every 12 hours as needed for severe pain only until you are seen by pain management and your neurologist. Please continue to monitor your symptoms and do not take any medication if you feel lethargic or like you are going to pass out.  Secondary Diagnosis:	Palpitations  Assessment and plan of treatment:	You experienced an episode of palpitations during your hospitalization and were seen by a cardiologist Dr. Madden. You had an EKG, cardiac enzymes and echo done. Please continue taking metoprolol and follow up with you cardiologist within 1 week.  Secondary Diagnosis:	Rhabdomyolysis  Assessment and plan of treatment:	You were found to have elevated muscle enzymes and were treated with fluids. Please drink plenty of fluids and follow up with your PCP within 3 days for follow up blood work.

## 2019-01-28 NOTE — DISCHARGE NOTE ADULT - CARE PROVIDER_API CALL
Sergio Parada), Critical Care Medicine; Internal Medicine; Pulmonary Disease  [address] [facility]  [address]  [place] [address]  Phone: [phone]  Fax: [phone]    Michael Sorensen), Cardiovascular Disease; Internal Medicine  [address] [facility]  [address]  [place] [address]  Phone: [phone]  Fax: [phone]

## 2019-01-28 NOTE — PROGRESS NOTE ADULT - SUBJECTIVE AND OBJECTIVE BOX
Chief Complaint: Unresponsiveness    Interval Events: No events overnight. No complaints.    Review of Systems:  General: No fevers, chills, weight loss or gain  Skin: No rashes, color changes  Cardiovascular: No chest pain, orthopnea  Respiratory: No shortness of breath, cough  Gastrointestinal: No nausea, abdominal pain  Genitourinary: No incontinence, pain with urination  Musculoskeletal: No pain, swelling, decreased range of motion  Neurological: No headache, weakness  Psychiatric: No depression, anxiety  Endocrine: No weight loss or gain, increased thirst  All other systems are comprehensively negative.    Physical Exam:  Vitals:        Vital Signs Last 24 Hrs  T(C): 36.8 (28 Jan 2019 04:51), Max: 36.8 (28 Jan 2019 04:51)  T(F): 98.2 (28 Jan 2019 04:51), Max: 98.2 (28 Jan 2019 04:51)  HR: 96 (28 Jan 2019 04:51) (74 - 96)  BP: 115/72 (28 Jan 2019 04:51) (96/62 - 118/72)  BP(mean): --  RR: 16 (28 Jan 2019 04:51) (16 - 17)  SpO2: 97% (28 Jan 2019 04:51) (97% - 97%)  General: NAD  HEENT: MMM  Neck: No JVD, no carotid bruit  Lungs: CTAB  CV: RRR, nl S1/S2, no M/R/G  Abdomen: S/NT/ND, +BS  Extremities: No LE edema, no cyanosis  Neuro: AAOx3, non-focal  Skin: No rash    Labs:                        13.5   11.87 )-----------( 169      ( 27 Jan 2019 05:35 )             41.1     01-27    140  |  104  |  25<H>  ----------------------------<  93  4.0   |  30  |  1.10    Ca    8.2<L>      27 Jan 2019 05:35  Mg     2.3     01-27    TPro  6.9  /  Alb  3.4  /  TBili  1.1  /  DBili  x   /  AST  143<H>  /  ALT  52  /  AlkPhos  117  01-26    CARDIAC MARKERS ( 27 Jan 2019 14:32 )  x     / x     / 4422 U/L / x     / x      CARDIAC MARKERS ( 27 Jan 2019 02:50 )  <.015 ng/mL / x     / x     / x     / x              Telemetry: Sinus rhythm

## 2019-01-28 NOTE — DISCHARGE NOTE ADULT - ADDITIONAL INSTRUCTIONS
-Please follow up with your primary doctor within one week.  -Patient and family to set up follow up appointments.  -Continue taking your medications as directed above.  -If symptoms persist/worsen, please call your PMD or return to the ED.

## 2019-01-28 NOTE — PROGRESS NOTE ADULT - PROBLEM SELECTOR PLAN 6
On Lovenox 40mg. On Lovenox 40mg for DVT ppx. cont iv fluids, follow cpk in AM  nephrology consulted Dr. Hogan, recs appreciated

## 2019-01-28 NOTE — DISCHARGE NOTE ADULT - SECONDARY DIAGNOSIS.
Fibromyalgia GERD (gastroesophageal reflux disease) Herniated cervical disc Palpitations Rhabdomyolysis

## 2019-01-28 NOTE — DISCHARGE NOTE ADULT - NS AS ACTIVITY OBS
No Heavy lifting/straining Walking-Indoors allowed/Bathing allowed/Showering allowed/Walking-Outdoors allowed/No Heavy lifting/straining

## 2019-01-28 NOTE — DISCHARGE NOTE ADULT - MEDICATION SUMMARY - MEDICATIONS TO STOP TAKING
I will STOP taking the medications listed below when I get home from the hospital:    Benadryl 25 mg oral tablet  -- 1 tab(s) by mouth once a day (at bedtime), As Needed

## 2019-01-28 NOTE — PROGRESS NOTE ADULT - PROBLEM SELECTOR PLAN 5
-Pt reports having right arm numbness and loss of function in December which resolved earlier this month. Was seen by outpatient neurologist and recommended to have MRI which he has not done yet.  - F/u outpatient for further workuo -Pt reports having right arm numbness and loss of function in December which resolved earlier this month. Was seen by outpatient neurologist and recommended to have MRI which he has not done yet.  - F/u outpatient for further workup -Pt reports having right arm numbness and loss of function in December which resolved earlier this month. Was seen by outpatient neurologist and recommended to have MRI which he has not done yet.  - MR head and cervical spine ordered

## 2019-01-28 NOTE — PROGRESS NOTE ADULT - SUBJECTIVE AND OBJECTIVE BOX
Patient is a 58y old  Male who presents with a chief complaint of Syncope, AMS (27 Jan 2019 13:40)      INTERVAL HPI/ OVERNIGHT EVENTS: No overnight events. Patient reports feeling better, has mild headache this morning and intermittent episodes of palpitations. Headache started this am, no precipitating events, is diffuse, non radiating, improved with Tylenol No fevers, chills, dizziness, blurred vision, syncope, near syncope, chest pain, left arm pain, numbness, tingling. Acosta removed this AM and pt is voiding.      T(C): 36.8 (01-28-19 @ 04:51), Max: 36.8 (01-28-19 @ 04:51)  HR: 96 (01-28-19 @ 04:51) (74 - 96)  BP: 115/72 (01-28-19 @ 04:51) (96/62 - 118/72)  RR: 16 (01-28-19 @ 04:51) (16 - 17)  SpO2: 97% (01-28-19 @ 04:51) (97% - 97%)  Wt(kg): --  I&O's Summary    27 Jan 2019 07:01  -  28 Jan 2019 07:00  --------------------------------------------------------  IN: 0 mL / OUT: 1300 mL / NET: -1300 mL        LABS:                        12.9   7.39  )-----------( 161      ( 28 Jan 2019 08:43 )             38.7     01-28    139  |  105  |  13  ----------------------------<  88  3.7   |  26  |  0.70    Ca    8.3<L>      28 Jan 2019 08:43  Phos  2.1     01-28  Mg     2.0     01-28      MEDICATIONS  (STANDING):  enoxaparin Injectable 40 milliGRAM(s) SubCutaneous every 24 hours  gabapentin 400 milliGRAM(s) Oral three times a day  metoprolol tartrate 50 milliGRAM(s) Oral three times a day  pantoprazole    Tablet 40 milliGRAM(s) Oral before breakfast  sodium chloride 0.9%. 1000 milliLiter(s) (75 mL/Hr) IV Continuous <Continuous>  tamsulosin 0.4 milliGRAM(s) Oral at bedtime    MEDICATIONS  (PRN):  acetaminophen   Tablet .. 650 milliGRAM(s) Oral every 6 hours PRN Mild Pain (1 - 3)  hydrOXYzine hydrochloride 25 milliGRAM(s) Oral at bedtime PRN Anxiety  ondansetron   Disintegrating Tablet 4 milliGRAM(s) Oral three times a day PRN Nausea and/or Vomiting  oxyCODONE    5 mG/acetaminophen 325 mG 1 Tablet(s) Oral every 4 hours PRN Severe Pain (7 - 10)    REVIEW OF SYSTEMS:  CONSTITUTIONAL: No fever, weight loss, or fatigue  EYES: No eye pain, visual disturbances, or discharge  ENMT:  No difficulty hearing, tinnitus, vertigo; No sinus or throat pain  NECK: No pain or stiffness  RESPIRATORY: No cough, wheezing, chills or hemoptysis; No shortness of breath  CARDIOVASCULAR: Admits palpitations, No chest pain, dizziness, or leg swelling  GASTROINTESTINAL: No abdominal or epigastric pain. No nausea, vomiting, or hematemesis; No diarrhea or constipation. No melena or hematochezia.  GENITOURINARY: No dysuria, frequency, hematuria, or incontinence  NEUROLOGICAL: Admits headaches. No memory loss, loss of strength, numbness, or tremors  SKIN: No itching, burning, rashes, or lesions   LYMPH NODES: No enlarged glands  MSK; Admits limited ROM of cervical spine due to pain (chronic)  Skin: Admits recent onset of erythematous spots on his face, upper extremities bilaterally        RADIOLOGY & ADDITIONAL TESTS:    Consultant(s) Notes Reviewed:  [x ] YES  [ ] NO    PHYSICAL EXAM:  GENERAL: NAD, well-groomed, well-developed  HEAD:  Atraumatic, Normocephalic  EYES: EOMI, PERRLA, conjunctiva and sclera clear  ENMT: No tonsillar erythema, exudates, or enlargement; Moist mucous membranes, Poor dentition, No lesions  NECK: Supple, No JVD  NERVOUS SYSTEM:  Alert & Oriented X3, Good concentration; Motor Strength 5/5 B/L upper and lower extremities  CHEST/LUNG: Clear to auscultation bilaterally; No rales, rhonchi, wheezing, or rubs  HEART: Regular rate and rhythm; No murmurs, rubs, or gallops  ABDOMEN: Soft, Nontender, Nondistended; Bowel sounds present  EXTREMITIES:  2+ Peripheral Pulses, No clubbing, cyanosis, or edema  LYMPH: No lymphadenopathy noted  SKIN: + healing erythematous lesions on face and b/l UE no drainage    Care Discussed with Consultants/Other Providers [x ] YES  [ ] NO

## 2019-01-28 NOTE — PROGRESS NOTE ADULT - PROBLEM SELECTOR PLAN 8
advance care planning and advance directives discussed  pt advised to provide health care proxy forms w appointed health care agent On Lovenox 40mg for DVT ppx.

## 2019-01-29 VITALS
TEMPERATURE: 98 F | RESPIRATION RATE: 20 BRPM | SYSTOLIC BLOOD PRESSURE: 112 MMHG | OXYGEN SATURATION: 100 % | DIASTOLIC BLOOD PRESSURE: 71 MMHG | HEART RATE: 78 BPM

## 2019-01-29 LAB
ALBUMIN SERPL ELPH-MCNC: 2.4 G/DL — LOW (ref 3.3–5)
ALP SERPL-CCNC: 62 U/L — SIGNIFICANT CHANGE UP (ref 40–120)
ALT FLD-CCNC: 50 U/L — SIGNIFICANT CHANGE UP (ref 12–78)
ANION GAP SERPL CALC-SCNC: 9 MMOL/L — SIGNIFICANT CHANGE UP (ref 5–17)
AST SERPL-CCNC: 104 U/L — HIGH (ref 15–37)
BILIRUB SERPL-MCNC: 0.5 MG/DL — SIGNIFICANT CHANGE UP (ref 0.2–1.2)
BUN SERPL-MCNC: 9 MG/DL — SIGNIFICANT CHANGE UP (ref 7–23)
CALCIUM SERPL-MCNC: 7.9 MG/DL — LOW (ref 8.5–10.1)
CHLORIDE SERPL-SCNC: 108 MMOL/L — SIGNIFICANT CHANGE UP (ref 96–108)
CK SERPL-CCNC: 1685 U/L — HIGH (ref 26–308)
CO2 SERPL-SCNC: 26 MMOL/L — SIGNIFICANT CHANGE UP (ref 22–31)
CREAT SERPL-MCNC: 0.68 MG/DL — SIGNIFICANT CHANGE UP (ref 0.5–1.3)
GLUCOSE SERPL-MCNC: 93 MG/DL — SIGNIFICANT CHANGE UP (ref 70–99)
HCT VFR BLD CALC: 35.5 % — LOW (ref 39–50)
HGB BLD-MCNC: 11.8 G/DL — LOW (ref 13–17)
MCHC RBC-ENTMCNC: 30.2 PG — SIGNIFICANT CHANGE UP (ref 27–34)
MCHC RBC-ENTMCNC: 33.2 GM/DL — SIGNIFICANT CHANGE UP (ref 32–36)
MCV RBC AUTO: 90.8 FL — SIGNIFICANT CHANGE UP (ref 80–100)
NRBC # BLD: 0 /100 WBCS — SIGNIFICANT CHANGE UP (ref 0–0)
PLATELET # BLD AUTO: 150 K/UL — SIGNIFICANT CHANGE UP (ref 150–400)
POTASSIUM SERPL-MCNC: 3.4 MMOL/L — LOW (ref 3.5–5.3)
POTASSIUM SERPL-SCNC: 3.4 MMOL/L — LOW (ref 3.5–5.3)
PROT SERPL-MCNC: 5.6 G/DL — LOW (ref 6–8.3)
RBC # BLD: 3.91 M/UL — LOW (ref 4.2–5.8)
RBC # FLD: 12.6 % — SIGNIFICANT CHANGE UP (ref 10.3–14.5)
SODIUM SERPL-SCNC: 143 MMOL/L — SIGNIFICANT CHANGE UP (ref 135–145)
T3 SERPL-MCNC: 108 NG/DL — SIGNIFICANT CHANGE UP (ref 80–200)
T4 AB SER-ACNC: 6.3 UG/DL — SIGNIFICANT CHANGE UP (ref 4.6–12)
TSH SERPL-MCNC: 2.84 UIU/ML — SIGNIFICANT CHANGE UP (ref 0.36–3.74)
WBC # BLD: 5.13 K/UL — SIGNIFICANT CHANGE UP (ref 3.8–10.5)
WBC # FLD AUTO: 5.13 K/UL — SIGNIFICANT CHANGE UP (ref 3.8–10.5)

## 2019-01-29 PROCEDURE — 80048 BASIC METABOLIC PNL TOTAL CA: CPT

## 2019-01-29 PROCEDURE — 80307 DRUG TEST PRSMV CHEM ANLYZR: CPT

## 2019-01-29 PROCEDURE — 93306 TTE W/DOPPLER COMPLETE: CPT

## 2019-01-29 PROCEDURE — 72040 X-RAY EXAM NECK SPINE 2-3 VW: CPT

## 2019-01-29 PROCEDURE — 70551 MRI BRAIN STEM W/O DYE: CPT

## 2019-01-29 PROCEDURE — 93005 ELECTROCARDIOGRAM TRACING: CPT

## 2019-01-29 PROCEDURE — 72141 MRI NECK SPINE W/O DYE: CPT

## 2019-01-29 PROCEDURE — 84436 ASSAY OF TOTAL THYROXINE: CPT

## 2019-01-29 PROCEDURE — 84480 ASSAY TRIIODOTHYRONINE (T3): CPT

## 2019-01-29 PROCEDURE — 84443 ASSAY THYROID STIM HORMONE: CPT

## 2019-01-29 PROCEDURE — 99285 EMERGENCY DEPT VISIT HI MDM: CPT | Mod: 25

## 2019-01-29 PROCEDURE — 71045 X-RAY EXAM CHEST 1 VIEW: CPT

## 2019-01-29 PROCEDURE — 84484 ASSAY OF TROPONIN QUANT: CPT

## 2019-01-29 PROCEDURE — 95816 EEG AWAKE AND DROWSY: CPT

## 2019-01-29 PROCEDURE — 96374 THER/PROPH/DIAG INJ IV PUSH: CPT

## 2019-01-29 PROCEDURE — 80053 COMPREHEN METABOLIC PANEL: CPT

## 2019-01-29 PROCEDURE — 83735 ASSAY OF MAGNESIUM: CPT

## 2019-01-29 PROCEDURE — 36415 COLL VENOUS BLD VENIPUNCTURE: CPT

## 2019-01-29 PROCEDURE — 85027 COMPLETE CBC AUTOMATED: CPT

## 2019-01-29 PROCEDURE — 70450 CT HEAD/BRAIN W/O DYE: CPT

## 2019-01-29 PROCEDURE — 84100 ASSAY OF PHOSPHORUS: CPT

## 2019-01-29 PROCEDURE — 86803 HEPATITIS C AB TEST: CPT

## 2019-01-29 PROCEDURE — 82550 ASSAY OF CK (CPK): CPT

## 2019-01-29 RX ORDER — OXYCODONE HYDROCHLORIDE 5 MG/1
5 TABLET ORAL EVERY 4 HOURS
Qty: 0 | Refills: 0 | Status: DISCONTINUED | OUTPATIENT
Start: 2019-01-29 | End: 2019-01-29

## 2019-01-29 RX ORDER — OXYCODONE HYDROCHLORIDE 5 MG/1
1 TABLET ORAL
Qty: 0 | Refills: 0 | COMMUNITY

## 2019-01-29 RX ORDER — OXYCODONE HYDROCHLORIDE 5 MG/1
1 TABLET ORAL
Qty: 0 | Refills: 0 | COMMUNITY
Start: 2019-01-29

## 2019-01-29 RX ORDER — POTASSIUM CHLORIDE 20 MEQ
40 PACKET (EA) ORAL ONCE
Qty: 0 | Refills: 0 | Status: COMPLETED | OUTPATIENT
Start: 2019-01-29 | End: 2019-01-29

## 2019-01-29 RX ADMIN — GABAPENTIN 400 MILLIGRAM(S): 400 CAPSULE ORAL at 05:13

## 2019-01-29 RX ADMIN — ENOXAPARIN SODIUM 40 MILLIGRAM(S): 100 INJECTION SUBCUTANEOUS at 09:18

## 2019-01-29 RX ADMIN — Medication 50 MILLIGRAM(S): at 05:14

## 2019-01-29 RX ADMIN — OXYCODONE AND ACETAMINOPHEN 1 TABLET(S): 5; 325 TABLET ORAL at 05:02

## 2019-01-29 RX ADMIN — OXYCODONE AND ACETAMINOPHEN 1 TABLET(S): 5; 325 TABLET ORAL at 04:34

## 2019-01-29 RX ADMIN — PANTOPRAZOLE SODIUM 40 MILLIGRAM(S): 20 TABLET, DELAYED RELEASE ORAL at 05:14

## 2019-01-29 RX ADMIN — Medication 40 MILLIEQUIVALENT(S): at 09:17

## 2019-01-29 NOTE — PROGRESS NOTE ADULT - PROVIDER SPECIALTY LIST ADULT
Cardiology
Cardiology
Internal Medicine
Internal Medicine
Nephrology
Neurology
Neurology
Internal Medicine

## 2019-01-29 NOTE — PROGRESS NOTE ADULT - SUBJECTIVE AND OBJECTIVE BOX
Neurology follow up note    JENNIFFER MAHAN58yMale      Interval History:    Patient feels ok no new complaints less muscle aches     MEDICATIONS    acetaminophen   Tablet .. 650 milliGRAM(s) Oral every 6 hours PRN  enoxaparin Injectable 40 milliGRAM(s) SubCutaneous every 24 hours  gabapentin 400 milliGRAM(s) Oral three times a day  hydrOXYzine hydrochloride 25 milliGRAM(s) Oral at bedtime PRN  metoprolol tartrate 50 milliGRAM(s) Oral three times a day  ondansetron   Disintegrating Tablet 4 milliGRAM(s) Oral three times a day PRN  oxyCODONE    IR 5 milliGRAM(s) Oral every 4 hours PRN  pantoprazole    Tablet 40 milliGRAM(s) Oral before breakfast  sodium chloride 0.9%. 1000 milliLiter(s) IV Continuous <Continuous>  tamsulosin 0.4 milliGRAM(s) Oral at bedtime      Allergies    clams (Stomach Upset)  prednisoLONE (Unknown)    Intolerances            Vital Signs Last 24 Hrs  T(C): 36.7 (29 Jan 2019 07:24), Max: 36.9 (28 Jan 2019 15:36)  T(F): 98 (29 Jan 2019 07:24), Max: 98.5 (28 Jan 2019 23:15)  HR: 71 (29 Jan 2019 07:24) (71 - 93)  BP: 107/66 (29 Jan 2019 07:24) (104/67 - 120/70)  BP(mean): --  RR: 16 (29 Jan 2019 07:24) (15 - 17)  SpO2: 99% (29 Jan 2019 07:24) (95% - 99%)    REVIEW OF SYSTEMS:  Constitutional: No fever, chills, fatigue, weakness  Eyes: no eye pain, visual disturbances, or discharge  ENT:  No difficulty hearing, tinnitus, vertigo; No sinus or throat pain  Neck: No pain or stiffness  Respiratory: No cough, dyspnea, wheezing   Cardiovascular: No chest pain, palpitations,   Gastrointestinal: No abdominal or epigastric pain. No nausea, vomiting  No diarrhea or constipation.   Genitourinary: No dysuria, frequency, hematuria or incontinence  Neurological: No headaches, lightheadedness, vertigo, numbness or tremors  Psychiatric: No depression, anxiety, mood swings or difficulty sleeping  Musculoskeletal: history of  extremity pain  Skin: No itching, burning, rashes or lesions   Lymph Nodes: No enlarged glands  Endocrine: No heat or cold intolerance; No hair loss   Allergy and Immunologic: No hives or eczema    On Neurological Examination:      The patient awake, alert, and oriented x3.   Extraocular movements were intact.    Pupils equal, round, and reactive bilaterally 3 mm to 2 mm.    Speech was fluent.    Smile symmetric.    Motor:  Bilateral upper and lower 4/5.    ensory:  Bilateral upper and lower intact to light touch.    Follow simple commands    GENERAL Exam: Nontoxic , No Acute Distress   	  HEENT:  normocephalic, atraumatic  		  LUNGS: Clear bilaterally   	  HEART: Normal S1S2   No murmur RRR        	  GI/ ABDOMEN:  Soft  Non tender    EXTREMITIES:   No Edema  No Clubbing  No Cyanosis     MUSCULOSKELETAL: decreased Range of Motion all 4 extremities   	   SKIN: Normal  No Ecchymosis               LABS:  CBC Full  -  ( 29 Jan 2019 06:15 )  WBC Count : 5.13 K/uL  Hemoglobin : 11.8 g/dL  Hematocrit : 35.5 %  Platelet Count - Automated : 150 K/uL  Mean Cell Volume : 90.8 fl  Mean Cell Hemoglobin : 30.2 pg  Mean Cell Hemoglobin Concentration : 33.2 gm/dL  Auto Neutrophil # : x  Auto Lymphocyte # : x  Auto Monocyte # : x  Auto Eosinophil # : x  Auto Basophil # : x  Auto Neutrophil % : x  Auto Lymphocyte % : x  Auto Monocyte % : x  Auto Eosinophil % : x  Auto Basophil % : x      01-29    143  |  108  |  9   ----------------------------<  93  3.4<L>   |  26  |  0.68    Ca    7.9<L>      29 Jan 2019 06:15  Phos  2.1     01-28  Mg     2.0     01-28    TPro  5.6<L>  /  Alb  2.4<L>  /  TBili  0.5  /  DBili  x   /  AST  104<H>  /  ALT  50  /  AlkPhos  62  01-29    Hemoglobin A1C:     LIVER FUNCTIONS - ( 29 Jan 2019 06:15 )  Alb: 2.4 g/dL / Pro: 5.6 g/dL / ALK PHOS: 62 U/L / ALT: 50 U/L / AST: 104 U/L / GGT: x           Vitamin B12         RADIOLOGY    ANALYSIS AND PLAN:  This is a 58-year-old with a history of migraines, radiculopathy, and episode of unresponsiveness.  1.	For episode of unresponsiveness, at present unclear etiology, but as per my conversation with brother and the patient, it appears to be a very prolonged period of time, and as per my conversation with brother, he does not feel that he take an extra medication. The patient has not been sleeping for the last three days, questionable the lack of sleep could have lowered the patient's seizure threshold leading to a seizure event.  The patient was in the postictal state.  2.	EEG was normal   3.	Ativan 1 mg IV push for any type of breakthrough seizures.  4.	CK levels noted IVF as needed monitor as needed  5.	For history of migraines, at present no headache, supportive therapy Tylenol as needed.  6.	For history of fibromyalgia, pain medications as needed.  7.	Monitor oral intake.  8.	I spoke with the patient and brother in great detail.  Brother's name is Tino at [phone] 1/28/19  9.	MRI noted   10.	NO AED needed at present I had a lengthy discussion with spouse and the patient in regards to driving, that he should not be driving for one year, that he is to avoid heights, not to go swimming unattended.    Neurologic standpoint only cleared for discharge planning     Thank you for the courtesy of this consultation.    Physical therapy evaluation as tolerated  OOB to chair/ambulation with assistance only if possible.    Greater than 45 minutes spent in direct patient care reviewing  the notes, lab data/ imaging , discussion with multidisciplinary team.

## 2019-01-29 NOTE — PROGRESS NOTE ADULT - SUBJECTIVE AND OBJECTIVE BOX
JENNIFFER MAHAN  58y  Male    Patient is a 58y old  Male who presents with a chief complaint of Syncope, AMS (29 Jan 2019 12:08)    feels well, ate better.     PAST MEDICAL & SURGICAL HISTORY:  Insomnia  GERD (gastroesophageal reflux disease)  Fibromyalgia  Hypothyroid  Herniated cervical disc  Anxiety  Radiculopathy: Rt. Arm  Headache, migraine  Back pain  S/P hernia repair: B/L          PHYSICAL EXAM:    T(C): 36.9 (01-29-19 @ 12:10), Max: 36.9 (01-28-19 @ 15:36)  HR: 78 (01-29-19 @ 12:10) (71 - 93)  BP: 112/71 (01-29-19 @ 12:10) (104/67 - 120/70)  RR: 20 (01-29-19 @ 12:10) (15 - 20)  SpO2: 100% (01-29-19 @ 12:10) (95% - 100%)  Wt(kg): --    I&O's Detail    28 Jan 2019 07:01  -  29 Jan 2019 07:00  --------------------------------------------------------  IN:    Oral Fluid: 910 mL    sodium chloride 0.9%.: 1650 mL  Total IN: 2560 mL    OUT:    Voided: 1300 mL  Total OUT: 1300 mL    Total NET: 1260 mL      29 Jan 2019 07:01  -  29 Jan 2019 13:17  --------------------------------------------------------  IN:    Oral Fluid: 120 mL    sodium chloride 0.9%.: 300 mL  Total IN: 420 mL    OUT:    Voided: 125 mL  Total OUT: 125 mL    Total NET: 295 mL          Respiratory: clear anteriorly, decreased BS at bases  Cardiovascular: S1 S2  Gastrointestinal: soft NT ND +BS  Extremities: edema   Neuro: Awake and alert    MEDICATIONS  (STANDING):  enoxaparin Injectable 40 milliGRAM(s) SubCutaneous every 24 hours  gabapentin 400 milliGRAM(s) Oral three times a day  metoprolol tartrate 50 milliGRAM(s) Oral three times a day  pantoprazole    Tablet 40 milliGRAM(s) Oral before breakfast  sodium chloride 0.9%. 1000 milliLiter(s) (100 mL/Hr) IV Continuous <Continuous>  tamsulosin 0.4 milliGRAM(s) Oral at bedtime    MEDICATIONS  (PRN):  acetaminophen   Tablet .. 650 milliGRAM(s) Oral every 6 hours PRN Mild Pain (1 - 3)  hydrOXYzine hydrochloride 25 milliGRAM(s) Oral at bedtime PRN Anxiety  ondansetron   Disintegrating Tablet 4 milliGRAM(s) Oral three times a day PRN Nausea and/or Vomiting  oxyCODONE    IR 5 milliGRAM(s) Oral every 4 hours PRN Severe Pain (7 - 10)                            11.8   5.13  )-----------( 150      ( 29 Jan 2019 06:15 )             35.5       01-29    143  |  108  |  9   ----------------------------<  93  3.4<L>   |  26  |  0.68    Creatine Kinase, Serum: 1685 U/L (01.29.19 @ 06:15)    Ca    7.9<L>      29 Jan 2019 06:15  Phos  2.1     01-28  Mg     2.0     01-28    TPro  5.6<L>  /  Alb  2.4<L>  /  TBili  0.5  /  DBili  x   /  AST  104<H>  /  ALT  50  /  AlkPhos  62  01-29

## 2019-01-29 NOTE — PROGRESS NOTE ADULT - PROBLEM SELECTOR PLAN 1
-Suspect secondary to medications/ polypharmacy  -[facility]  -EEG no seizure activity  -CT Head showed no acute intracranial hemorrhage, mass effect, or CT evidence of an acute vascular territorial infarct.  - MRI head- no acute changes  - MRI Cervical spine- Mild spinal cord impingement secondary to tiny extruded disc at C4-5 and mild spondylosis at C5-6.  -Utox positive for opiates (on oxycodone at home)   -Blood alcohol, salicylate, acetaminophen levels all wnl.  - Pain is controlled with Tylenol and Percocet 5/325 Q4.  - Has been ambulating without assistance. No complaints of dizziness or unsteady gait. -Suspect secondary to medications/ polypharmacy  -[facility]  -EEG no seizure activity  -CT Head showed no acute intracranial hemorrhage, mass effect, or CT evidence of an acute vascular territorial infarct.  - MRI head- no acute changes  - MRI Cervical spine- Mild spinal cord impingement secondary to tiny extruded disc at C4-5 and mild spondylosis at C5-6.  -Utox positive for opiates (on oxycodone at home)   -Blood alcohol, salicylate, acetaminophen levels all wnl.  - Pain is controlled with Tylenol and Oxycodone 5mg Q4.  - Has been ambulating without assistance. No complaints of dizziness or unsteady gait.

## 2019-01-29 NOTE — PROGRESS NOTE ADULT - SUBJECTIVE AND OBJECTIVE BOX
Patient is a 58y old  Male who presents with a chief complaint of Syncope, AMS (28 Jan 2019 12:31)      INTERVAL HPI/ OVERNIGHT EVENTS:    T(C): 36.7 (01-29-19 @ 07:24), Max: 36.9 (01-28-19 @ 15:36)  HR: 71 (01-29-19 @ 07:24) (71 - 93)  BP: 107/66 (01-29-19 @ 07:24) (104/67 - 120/70)  RR: 16 (01-29-19 @ 07:24) (15 - 17)  SpO2: 99% (01-29-19 @ 07:24) (95% - 99%)  Wt(kg): --  I&O's Summary    28 Jan 2019 07:01  -  29 Jan 2019 07:00  --------------------------------------------------------  IN: 2560 mL / OUT: 1300 mL / NET: 1260 mL    29 Jan 2019 07:01  -  29 Jan 2019 07:37  --------------------------------------------------------  IN: 0 mL / OUT: 125 mL / NET: -125 mL        LABS:                        11.8   5.13  )-----------( 150      ( 29 Jan 2019 06:15 )             35.5     01-29    143  |  108  |  9   ----------------------------<  93  3.4<L>   |  26  |  0.68    Ca    7.9<L>      29 Jan 2019 06:15  Phos  2.1     01-28  Mg     2.0     01-28    TPro  5.6<L>  /  Alb  2.4<L>  /  TBili  0.5  /  DBili  x   /  AST  104<H>  /  ALT  50  /  AlkPhos  62  01-29        CAPILLARY BLOOD GLUCOSE                MEDICATIONS  (STANDING):  enoxaparin Injectable 40 milliGRAM(s) SubCutaneous every 24 hours  gabapentin 400 milliGRAM(s) Oral three times a day  metoprolol tartrate 50 milliGRAM(s) Oral three times a day  pantoprazole    Tablet 40 milliGRAM(s) Oral before breakfast  potassium chloride    Tablet ER 40 milliEquivalent(s) Oral once  sodium chloride 0.9%. 1000 milliLiter(s) (100 mL/Hr) IV Continuous <Continuous>  tamsulosin 0.4 milliGRAM(s) Oral at bedtime    MEDICATIONS  (PRN):  acetaminophen   Tablet .. 650 milliGRAM(s) Oral every 6 hours PRN Mild Pain (1 - 3)  hydrOXYzine hydrochloride 25 milliGRAM(s) Oral at bedtime PRN Anxiety  ondansetron   Disintegrating Tablet 4 milliGRAM(s) Oral three times a day PRN Nausea and/or Vomiting  oxyCODONE    5 mG/acetaminophen 325 mG 1 Tablet(s) Oral every 4 hours PRN Severe Pain (7 - 10)      REVIEW OF SYSTEMS:  CONSTITUTIONAL: No fever, weight loss, or fatigue  EYES: No eye pain, visual disturbances, or discharge  ENMT:  No difficulty hearing, tinnitus, vertigo; No sinus or throat pain  NECK: No pain or stiffness  RESPIRATORY: No cough, wheezing, chills or hemoptysis; No shortness of breath  CARDIOVASCULAR: Admits palpitations, No chest pain, dizziness, or leg swelling  GASTROINTESTINAL: No abdominal or epigastric pain. No nausea, vomiting, or hematemesis; No diarrhea or constipation. No melena or hematochezia.  GENITOURINARY: No dysuria, frequency, hematuria, or incontinence  NEUROLOGICAL: Admits headaches. No memory loss, loss of strength, numbness, or tremors  SKIN: No itching, burning, rashes, or lesions   LYMPH NODES: No enlarged glands  MSK; Admits limited ROM of cervical spine due to pain (chronic)  Skin: Admits recent onset of erythematous spots on his face, upper extremities bilaterally        RADIOLOGY & ADDITIONAL TESTS:  < from: MR Cervical Spine No Cont (01.28.19 @ 16:27) >  EXAM:  MR SPINE CERVICAL                            PROCEDURE DATE:  01/28/2019          INTERPRETATION:  MRI cervical spine without contrast    History neck pain and right arm numbness    There is slightly exaggerated cervical lordosis without acute compression   deformity or subluxation or marrow infiltration or edema. There is mild   chronic wedge compression of T3.the Spinal cord is normal in signal.    The C2-3 and C3-4 levels are normal    Disc height is preserved at C4-5. There is a tinyextruded central disc   fragment with inferior migration. It Slightly deforms the ventral spinal   cord without pedro luis compression. There is mild dorsal ligamentous   hypertrophy at C5 contributing to overall mild spinal stenosis    At C5-6 there is mild degenerative loss of disc height and mild dorsal   osteophyte encroaching on and mildly deforming the ventral spinal cord.   There is uncinate hypertrophy resulting in severe left and mild right   foraminal stenosis.    At C6-7 disc height is preserved. There is mild diffuse degenerative   dorsal disc bulging encroaching on but not significantly displacing or   deforming the spinal cord. Uncinate hypertrophy results in moderate   bilateral foraminal stenosis    C7-T1 level is within normal limits.    IMPRESSION:    Mild spinal cord impingement secondary to tiny extruded disc at C4-5 and   mild spondylosis at C5-6..        < from: MR Head No Cont (01.28.19 @ 16:16) >  EXAM:  MR BRAIN                            PROCEDURE DATE:  01/28/2019          INTERPRETATION:  MRI brain without contrast    History syncope    Comparison CT performed 2 days prior    There is no mass effect, cortical edema or hydrocephalus. Cortical volume   and white matter signal are preserved. The orbital and sellar contents   and cerebellar tonsils are within normal limits. There is no evidence of   acute infarct or previous parenchymal hemorrhage.    IMPRESSION:    Normal brain        Consultant(s) Notes Reviewed:  [x ] YES  [ ] NO    PHYSICAL EXAM:  GENERAL: NAD, well-groomed, well-developed  HEAD:  Atraumatic, Normocephalic  EYES: EOMI, PERRLA, conjunctiva and sclera clear  ENMT: No tonsillar erythema, exudates, or enlargement; Moist mucous membranes, Poor dentition, No lesions  NECK: Supple, No JVD  NERVOUS SYSTEM:  Alert & Oriented X3, Good concentration; Motor Strength 5/5 B/L upper and lower extremities  CHEST/LUNG: Clear to auscultation bilaterally; No rales, rhonchi, wheezing, or rubs  HEART: Regular rate and rhythm; No murmurs, rubs, or gallops  ABDOMEN: Soft, Nontender, Nondistended; Bowel sounds present  EXTREMITIES:  2+ Peripheral Pulses, No clubbing, cyanosis, or edema  LYMPH: No lymphadenopathy noted  SKIN: + healing erythematous lesions on face and b/l UE no drainage    Care Discussed with Consultants/Other Providers [x ] YES  [ ] NO Patient is a 58y old  Male who presents with a chief complaint of Syncope, AMS (28 Jan 2019 12:31)      INTERVAL HPI/ OVERNIGHT EVENTS: No overnight events. Admits mild headache this morning which has improved. Palpitations resolved. Denies f/c, dizziness, blurred vision, chest pain, palpitations, abdominal pain, n,v,d.    T(C): 36.7 (01-29-19 @ 07:24), Max: 36.9 (01-28-19 @ 15:36)  HR: 71 (01-29-19 @ 07:24) (71 - 93)  BP: 107/66 (01-29-19 @ 07:24) (104/67 - 120/70)  RR: 16 (01-29-19 @ 07:24) (15 - 17)  SpO2: 99% (01-29-19 @ 07:24) (95% - 99%)  Wt(kg): --  I&O's Summary    28 Jan 2019 07:01  -  29 Jan 2019 07:00  --------------------------------------------------------  IN: 2560 mL / OUT: 1300 mL / NET: 1260 mL    29 Jan 2019 07:01  -  29 Jan 2019 07:37  --------------------------------------------------------  IN: 0 mL / OUT: 125 mL / NET: -125 mL        LABS:                        11.8   5.13  )-----------( 150      ( 29 Jan 2019 06:15 )             35.5     01-29    143  |  108  |  9   ----------------------------<  93  3.4<L>   |  26  |  0.68    Ca    7.9<L>      29 Jan 2019 06:15  Phos  2.1     01-28  Mg     2.0     01-28    TPro  5.6<L>  /  Alb  2.4<L>  /  TBili  0.5  /  DBili  x   /  AST  104<H>  /  ALT  50  /  AlkPhos  62  01-29        CAPILLARY BLOOD GLUCOSE                MEDICATIONS  (STANDING):  enoxaparin Injectable 40 milliGRAM(s) SubCutaneous every 24 hours  gabapentin 400 milliGRAM(s) Oral three times a day  metoprolol tartrate 50 milliGRAM(s) Oral three times a day  pantoprazole    Tablet 40 milliGRAM(s) Oral before breakfast  potassium chloride    Tablet ER 40 milliEquivalent(s) Oral once  sodium chloride 0.9%. 1000 milliLiter(s) (100 mL/Hr) IV Continuous <Continuous>  tamsulosin 0.4 milliGRAM(s) Oral at bedtime    MEDICATIONS  (PRN):  acetaminophen   Tablet .. 650 milliGRAM(s) Oral every 6 hours PRN Mild Pain (1 - 3)  hydrOXYzine hydrochloride 25 milliGRAM(s) Oral at bedtime PRN Anxiety  ondansetron   Disintegrating Tablet 4 milliGRAM(s) Oral three times a day PRN Nausea and/or Vomiting  oxyCODONE    5 mG/acetaminophen 325 mG 1 Tablet(s) Oral every 4 hours PRN Severe Pain (7 - 10)      REVIEW OF SYSTEMS:  CONSTITUTIONAL: No fever, weight loss, or fatigue  EYES: No eye pain, visual disturbances, or discharge  ENMT:  No difficulty hearing, tinnitus, vertigo; No sinus or throat pain  NECK: No pain or stiffness  RESPIRATORY: No cough, wheezing, chills or hemoptysis; No shortness of breath  CARDIOVASCULAR: No palpitations, No chest pain, dizziness, or leg swelling  GASTROINTESTINAL: No abdominal or epigastric pain. No nausea, vomiting, or hematemesis; No diarrhea or constipation. No melena or hematochezia.  GENITOURINARY: No dysuria, frequency, hematuria, or incontinence  NEUROLOGICAL: Admits headaches. No memory loss, loss of strength, numbness, or tremors  SKIN: No itching, burning, rashes, or lesions   LYMPH NODES: No enlarged glands  MSK; Admits limited ROM of cervical spine due to pain (chronic)  Skin: Admits recent onset of erythematous spots on his face, upper extremities bilaterally        RADIOLOGY & ADDITIONAL TESTS:  < from: MR Cervical Spine No Cont (01.28.19 @ 16:27) >  EXAM:  MR SPINE CERVICAL                            PROCEDURE DATE:  01/28/2019          INTERPRETATION:  MRI cervical spine without contrast    History neck pain and right arm numbness    There is slightly exaggerated cervical lordosis without acute compression   deformity or subluxation or marrow infiltration or edema. There is mild   chronic wedge compression of T3.the Spinal cord is normal in signal.    The C2-3 and C3-4 levels are normal    Disc height is preserved at C4-5. There is a tinyextruded central disc   fragment with inferior migration. It Slightly deforms the ventral spinal   cord without pedro luis compression. There is mild dorsal ligamentous   hypertrophy at C5 contributing to overall mild spinal stenosis    At C5-6 there is mild degenerative loss of disc height and mild dorsal   osteophyte encroaching on and mildly deforming the ventral spinal cord.   There is uncinate hypertrophy resulting in severe left and mild right   foraminal stenosis.    At C6-7 disc height is preserved. There is mild diffuse degenerative   dorsal disc bulging encroaching on but not significantly displacing or   deforming the spinal cord. Uncinate hypertrophy results in moderate   bilateral foraminal stenosis    C7-T1 level is within normal limits.    IMPRESSION:    Mild spinal cord impingement secondary to tiny extruded disc at C4-5 and   mild spondylosis at C5-6..        < from: MR Head No Cont (01.28.19 @ 16:16) >  EXAM:  MR BRAIN                            PROCEDURE DATE:  01/28/2019          INTERPRETATION:  MRI brain without contrast    History syncope    Comparison CT performed 2 days prior    There is no mass effect, cortical edema or hydrocephalus. Cortical volume   and white matter signal are preserved. The orbital and sellar contents   and cerebellar tonsils are within normal limits. There is no evidence of   acute infarct or previous parenchymal hemorrhage.    IMPRESSION:    Normal brain        Consultant(s) Notes Reviewed:  [x ] YES  [ ] NO    PHYSICAL EXAM:  GENERAL: NAD, well-groomed, well-developed  HEAD:  Atraumatic, Normocephalic  EYES: EOMI, PERRLA, conjunctiva and sclera clear  ENMT: No tonsillar erythema, exudates, or enlargement; Moist mucous membranes, Poor dentition, No lesions  NECK: Supple, No JVD  NERVOUS SYSTEM:  Alert & Oriented X3, Good concentration; Motor Strength 5/5 B/L upper and lower extremities  CHEST/LUNG: Clear to auscultation bilaterally; No rales, rhonchi, wheezing, or rubs  HEART: Regular rate and rhythm; No murmurs, rubs, or gallops  ABDOMEN: Soft, Nontender, Nondistended; Bowel sounds present  EXTREMITIES:  2+ Peripheral Pulses, No clubbing, cyanosis, or edema  LYMPH: No lymphadenopathy noted  SKIN: + healing erythematous lesions on face and b/l UE no drainage    Care Discussed with Consultants/Other Providers [x ] YES  [ ] NO

## 2019-01-29 NOTE — PROGRESS NOTE ADULT - PROBLEM SELECTOR PLAN 6
Improved CPK today  [id number]  Cont iv fluids,   Nephrology consulted Dr. Hogan, recs appreciated  Follow cpk in AM

## 2019-01-29 NOTE — PROGRESS NOTE ADULT - PROBLEM SELECTOR PLAN 3
Acosta removed this AM, is voiding.   On Tamsulosin  Avoid diphenhydramine
Voiding without Acosta  On Tamsulosin  Avoid diphenhydramine
cont current meds

## 2019-01-29 NOTE — PROGRESS NOTE ADULT - ASSESSMENT
58 yr old male w syncope, altered mental status, likely secondary to opiates.
59 yo M with PMH of Cervical Disc Disease, Fibromyalgia, ?Arrhythmia, Hypothyroidism, Anxiety, Migraines, GERD, Insomnia, presented to the ED after a syncopal episode. Found to have rhabdomyolysis which resolved with IV fluids. Stable from renal stand point for possible discharge.
The patient is a 58 year old male with a history of thyroid disease, tachycardia, anxiety, GERD who presents with unresponsiveness, rule-out syncope.    Plan:  - ECG with no evidence of ischemia or infarction  - Serial cardiac enzymes negative  - No events noted on telemetry  - Echocardiogram pending  - Continue metoprolol  - Neurology follow-up
The patient is a 58 year old male with a history of thyroid disease, tachycardia, anxiety, GERD who presents with unresponsiveness, rule-out syncope.    Plan:  - ECG with no evidence of ischemia or infarction  - Serial cardiac enzymes negative  - No events noted on telemetry  - Echocardiogram with normal LV systolic function, no significant valve issues  - Continue metoprolol  - On IV fluids for rhabdo  - Neurology follow-up

## 2019-01-29 NOTE — PROGRESS NOTE ADULT - SUBJECTIVE AND OBJECTIVE BOX
Chief Complaint: Unresponsiveness    Interval Events: No events overnight. No complaints.    Review of Systems:  General: No fevers, chills, weight loss or gain  Skin: No rashes, color changes  Cardiovascular: No chest pain, orthopnea  Respiratory: No shortness of breath, cough  Gastrointestinal: No nausea, abdominal pain  Genitourinary: No incontinence, pain with urination  Musculoskeletal: No pain, swelling, decreased range of motion  Neurological: No headache, weakness  Psychiatric: No depression, anxiety  Endocrine: No weight loss or gain, increased thirst  All other systems are comprehensively negative.    Physical Exam:  Vital Signs Last 24 Hrs  T(C): 36.7 (29 Jan 2019 07:24), Max: 36.9 (28 Jan 2019 15:36)  T(F): 98 (29 Jan 2019 07:24), Max: 98.5 (28 Jan 2019 23:15)  HR: 71 (29 Jan 2019 07:24) (71 - 93)  BP: 107/66 (29 Jan 2019 07:24) (104/67 - 120/70)  BP(mean): --  RR: 16 (29 Jan 2019 07:24) (15 - 17)  SpO2: 99% (29 Jan 2019 07:24) (95% - 99%)  General: NAD  HEENT: MMM  Neck: No JVD, no carotid bruit  Lungs: CTAB  CV: RRR, nl S1/S2, no M/R/G  Abdomen: S/NT/ND, +BS  Extremities: No LE edema, no cyanosis  Neuro: AAOx3, non-focal  Skin: No rash    Labs:    01-29    143  |  108  |  9   ----------------------------<  93  3.4<L>   |  26  |  0.68    Ca    7.9<L>      29 Jan 2019 06:15  Phos  2.1     01-28  Mg     2.0     01-28    TPro  5.6<L>  /  Alb  2.4<L>  /  TBili  0.5  /  DBili  x   /  AST  104<H>  /  ALT  50  /  AlkPhos  62  01-29                        11.8   5.13  )-----------( 150      ( 29 Jan 2019 06:15 )             35.5       Telemetry: Sinus rhythm

## 2019-01-29 NOTE — PROGRESS NOTE ADULT - PROBLEM SELECTOR PLAN 4
Chronic, stable  Continue current meds
Chronic, stable  Continue current meds
pt advised to avoid any further diphenhydramine  start flomax  TOV in am tomorrow

## 2019-01-29 NOTE — PROGRESS NOTE ADULT - PROBLEM SELECTOR PLAN 5
-Pt reports having right arm numbness and loss of function in December which resolved earlier this month.   - MRI head- no acute changes  - MRI Cervical spine- Mild spinal cord impingement secondary to tiny extruded disc at C4-5 and mild spondylosis at C5-6.

## 2019-01-29 NOTE — PROGRESS NOTE ADULT - PROBLEM SELECTOR PLAN 2
- Patient reports new onset of intermittent palpitations  - Seen by cardio Dr. Madden, [facility] appreciated  - Echo done, pending interpretation  - CE trended   - ECG with no evidence of ischemia or infarction  - No events noted on telemetry  - Continue metoprolol  -Phos 2.1. Supplemented
- Patient reports new onset of intermittent palpitations  - Seen by cardio Dr. Madden, [facility] appreciated  - Echo done, EF estimated 65%  - CE trended   - ECG with no evidence of ischemia or infarction  - No events noted on telemetry  - Continue metoprolol  - Hypokalemia 3.4. Supplemented
as above.  pt now describes palpitations as well- cardio eval

## 2019-01-29 NOTE — PROGRESS NOTE ADULT - PROBLEM SELECTOR PLAN 7
advance care planning and advance directives discussed  pt advised to provide health care proxy forms w appointed health care agent

## 2019-11-03 ENCOUNTER — EMERGENCY (EMERGENCY)
Facility: HOSPITAL | Age: 59
LOS: 1 days | Discharge: HOME | End: 2019-11-03
Attending: EMERGENCY MEDICINE | Admitting: EMERGENCY MEDICINE
Payer: COMMERCIAL

## 2019-11-03 VITALS
TEMPERATURE: 98 F | OXYGEN SATURATION: 98 % | HEART RATE: 81 BPM | SYSTOLIC BLOOD PRESSURE: 129 MMHG | DIASTOLIC BLOOD PRESSURE: 88 MMHG | RESPIRATION RATE: 14 BRPM

## 2019-11-03 VITALS
SYSTOLIC BLOOD PRESSURE: 136 MMHG | WEIGHT: 160.06 LBS | HEIGHT: 64 IN | RESPIRATION RATE: 15 BRPM | OXYGEN SATURATION: 97 % | TEMPERATURE: 98 F | DIASTOLIC BLOOD PRESSURE: 82 MMHG | HEART RATE: 83 BPM

## 2019-11-03 PROBLEM — M79.7 FIBROMYALGIA: Chronic | Status: ACTIVE | Noted: 2019-01-26

## 2019-11-03 PROBLEM — G47.00 INSOMNIA, UNSPECIFIED: Chronic | Status: ACTIVE | Noted: 2019-01-26

## 2019-11-03 PROBLEM — K21.9 GASTRO-ESOPHAGEAL REFLUX DISEASE WITHOUT ESOPHAGITIS: Chronic | Status: ACTIVE | Noted: 2019-01-26

## 2019-11-03 LAB
ALBUMIN SERPL ELPH-MCNC: 4 G/DL — SIGNIFICANT CHANGE UP (ref 3.3–5)
ALP SERPL-CCNC: 92 U/L — SIGNIFICANT CHANGE UP (ref 30–120)
ALT FLD-CCNC: 29 U/L DA — SIGNIFICANT CHANGE UP (ref 10–60)
AMPHET UR-MCNC: NEGATIVE — SIGNIFICANT CHANGE UP
ANION GAP SERPL CALC-SCNC: 6 MMOL/L — SIGNIFICANT CHANGE UP (ref 5–17)
APPEARANCE UR: CLEAR — SIGNIFICANT CHANGE UP
APTT BLD: 37.5 SEC — HIGH (ref 28.5–37)
AST SERPL-CCNC: 31 U/L — SIGNIFICANT CHANGE UP (ref 10–40)
BARBITURATES UR SCN-MCNC: NEGATIVE — SIGNIFICANT CHANGE UP
BASOPHILS # BLD AUTO: 0.05 K/UL — SIGNIFICANT CHANGE UP (ref 0–0.2)
BASOPHILS NFR BLD AUTO: 0.7 % — SIGNIFICANT CHANGE UP (ref 0–2)
BENZODIAZ UR-MCNC: NEGATIVE — SIGNIFICANT CHANGE UP
BILIRUB SERPL-MCNC: 0.8 MG/DL — SIGNIFICANT CHANGE UP (ref 0.2–1.2)
BILIRUB UR-MCNC: NEGATIVE — SIGNIFICANT CHANGE UP
BUN SERPL-MCNC: 16 MG/DL — SIGNIFICANT CHANGE UP (ref 7–23)
CALCIUM SERPL-MCNC: 9.6 MG/DL — SIGNIFICANT CHANGE UP (ref 8.4–10.5)
CHLORIDE SERPL-SCNC: 99 MMOL/L — SIGNIFICANT CHANGE UP (ref 96–108)
CO2 SERPL-SCNC: 31 MMOL/L — SIGNIFICANT CHANGE UP (ref 22–31)
COCAINE METAB.OTHER UR-MCNC: NEGATIVE — SIGNIFICANT CHANGE UP
COLOR SPEC: YELLOW — SIGNIFICANT CHANGE UP
CREAT SERPL-MCNC: 0.94 MG/DL — SIGNIFICANT CHANGE UP (ref 0.5–1.3)
DIFF PNL FLD: NEGATIVE — SIGNIFICANT CHANGE UP
EOSINOPHIL # BLD AUTO: 0.3 K/UL — SIGNIFICANT CHANGE UP (ref 0–0.5)
EOSINOPHIL NFR BLD AUTO: 4.1 % — SIGNIFICANT CHANGE UP (ref 0–6)
GLUCOSE SERPL-MCNC: 112 MG/DL — HIGH (ref 70–99)
GLUCOSE UR QL: NEGATIVE MG/DL — SIGNIFICANT CHANGE UP
HCT VFR BLD CALC: 41.1 % — SIGNIFICANT CHANGE UP (ref 39–50)
HGB BLD-MCNC: 13.5 G/DL — SIGNIFICANT CHANGE UP (ref 13–17)
IMM GRANULOCYTES NFR BLD AUTO: 0.3 % — SIGNIFICANT CHANGE UP (ref 0–1.5)
INR BLD: 1.11 RATIO — SIGNIFICANT CHANGE UP (ref 0.88–1.16)
KETONES UR-MCNC: ABNORMAL
LEUKOCYTE ESTERASE UR-ACNC: NEGATIVE — SIGNIFICANT CHANGE UP
LYMPHOCYTES # BLD AUTO: 1.27 K/UL — SIGNIFICANT CHANGE UP (ref 1–3.3)
LYMPHOCYTES # BLD AUTO: 17.3 % — SIGNIFICANT CHANGE UP (ref 13–44)
MCHC RBC-ENTMCNC: 30.7 PG — SIGNIFICANT CHANGE UP (ref 27–34)
MCHC RBC-ENTMCNC: 32.8 GM/DL — SIGNIFICANT CHANGE UP (ref 32–36)
MCV RBC AUTO: 93.4 FL — SIGNIFICANT CHANGE UP (ref 80–100)
METHADONE UR-MCNC: NEGATIVE — SIGNIFICANT CHANGE UP
MONOCYTES # BLD AUTO: 0.8 K/UL — SIGNIFICANT CHANGE UP (ref 0–0.9)
MONOCYTES NFR BLD AUTO: 10.9 % — SIGNIFICANT CHANGE UP (ref 2–14)
NEUTROPHILS # BLD AUTO: 4.91 K/UL — SIGNIFICANT CHANGE UP (ref 1.8–7.4)
NEUTROPHILS NFR BLD AUTO: 66.7 % — SIGNIFICANT CHANGE UP (ref 43–77)
NITRITE UR-MCNC: NEGATIVE — SIGNIFICANT CHANGE UP
NRBC # BLD: 0 /100 WBCS — SIGNIFICANT CHANGE UP (ref 0–0)
OPIATES UR-MCNC: POSITIVE — SIGNIFICANT CHANGE UP
PCP SPEC-MCNC: SIGNIFICANT CHANGE UP
PCP UR-MCNC: NEGATIVE — SIGNIFICANT CHANGE UP
PH UR: 7 — SIGNIFICANT CHANGE UP (ref 5–8)
PLATELET # BLD AUTO: 246 K/UL — SIGNIFICANT CHANGE UP (ref 150–400)
POTASSIUM SERPL-MCNC: 3.6 MMOL/L — SIGNIFICANT CHANGE UP (ref 3.5–5.3)
POTASSIUM SERPL-SCNC: 3.6 MMOL/L — SIGNIFICANT CHANGE UP (ref 3.5–5.3)
PROT SERPL-MCNC: 7.6 G/DL — SIGNIFICANT CHANGE UP (ref 6–8.3)
PROT UR-MCNC: NEGATIVE MG/DL — SIGNIFICANT CHANGE UP
PROTHROM AB SERPL-ACNC: 12.1 SEC — SIGNIFICANT CHANGE UP (ref 10–12.9)
RBC # BLD: 4.4 M/UL — SIGNIFICANT CHANGE UP (ref 4.2–5.8)
RBC # FLD: 12.5 % — SIGNIFICANT CHANGE UP (ref 10.3–14.5)
SODIUM SERPL-SCNC: 136 MMOL/L — SIGNIFICANT CHANGE UP (ref 135–145)
SP GR SPEC: 1.01 — SIGNIFICANT CHANGE UP (ref 1.01–1.02)
THC UR QL: NEGATIVE — SIGNIFICANT CHANGE UP
TROPONIN I SERPL-MCNC: 0 NG/ML — LOW (ref 0.02–0.06)
UROBILINOGEN FLD QL: NEGATIVE MG/DL — SIGNIFICANT CHANGE UP
WBC # BLD: 7.35 K/UL — SIGNIFICANT CHANGE UP (ref 3.8–10.5)
WBC # FLD AUTO: 7.35 K/UL — SIGNIFICANT CHANGE UP (ref 3.8–10.5)

## 2019-11-03 PROCEDURE — 81003 URINALYSIS AUTO W/O SCOPE: CPT

## 2019-11-03 PROCEDURE — 85027 COMPLETE CBC AUTOMATED: CPT

## 2019-11-03 PROCEDURE — 99285 EMERGENCY DEPT VISIT HI MDM: CPT

## 2019-11-03 PROCEDURE — 85730 THROMBOPLASTIN TIME PARTIAL: CPT

## 2019-11-03 PROCEDURE — 93010 ELECTROCARDIOGRAM REPORT: CPT

## 2019-11-03 PROCEDURE — 93005 ELECTROCARDIOGRAM TRACING: CPT

## 2019-11-03 PROCEDURE — 84484 ASSAY OF TROPONIN QUANT: CPT

## 2019-11-03 PROCEDURE — 80053 COMPREHEN METABOLIC PANEL: CPT

## 2019-11-03 PROCEDURE — 99284 EMERGENCY DEPT VISIT MOD MDM: CPT

## 2019-11-03 PROCEDURE — 80307 DRUG TEST PRSMV CHEM ANLYZR: CPT

## 2019-11-03 PROCEDURE — 36415 COLL VENOUS BLD VENIPUNCTURE: CPT

## 2019-11-03 PROCEDURE — 85610 PROTHROMBIN TIME: CPT

## 2019-11-03 RX ORDER — ONDANSETRON 8 MG/1
1 TABLET, FILM COATED ORAL
Qty: 0 | Refills: 0 | DISCHARGE

## 2019-11-03 RX ORDER — METOPROLOL TARTRATE 50 MG
1 TABLET ORAL
Qty: 0 | Refills: 0 | DISCHARGE

## 2019-11-03 RX ORDER — DOXEPIN HCL 100 MG
1 CAPSULE ORAL
Qty: 0 | Refills: 0 | DISCHARGE

## 2019-11-03 RX ORDER — GABAPENTIN 400 MG/1
400 CAPSULE ORAL ONCE
Refills: 0 | Status: COMPLETED | OUTPATIENT
Start: 2019-11-03 | End: 2019-11-03

## 2019-11-03 RX ORDER — HYDROXYZINE HCL 10 MG
1 TABLET ORAL
Qty: 0 | Refills: 0 | DISCHARGE

## 2019-11-03 RX ORDER — OXYCODONE HYDROCHLORIDE 5 MG/1
1 TABLET ORAL
Qty: 0 | Refills: 0 | DISCHARGE

## 2019-11-03 RX ADMIN — GABAPENTIN 400 MILLIGRAM(S): 400 CAPSULE ORAL at 12:44

## 2019-11-03 NOTE — ED PROVIDER NOTE - PATIENT PORTAL LINK FT
You can access the FollowMyHealth Patient Portal offered by [facility] by registering at the following website: [email or web address]. By joining [facility]’s FollowMyHealth portal, you will also be able to view your health information using other applications (apps) compatible with our system.

## 2019-11-03 NOTE — ED PROVIDER NOTE - ENMT, MLM
Airway patent, Nasal mucosa clear. Mouth with normal mucosa. Throat has no vesicles, no oropharyngeal exudates and uvula is midline. Mohs Histo Method Verbiage: Each section was then chromacoded and processed in the Mohs lab using the Mohs protocol and submitted for frozen section.

## 2019-11-03 NOTE — ED PROVIDER NOTE - PMH
Anxiety    Back pain    Fibromyalgia    GERD (gastroesophageal reflux disease)    Headache, migraine    Herniated cervical disc    High cholesterol    Hyperthyroidism    Hypothyroid    Insomnia    Radiculopathy  Rt. Arm

## 2019-11-03 NOTE — ED ADULT NURSE NOTE - SKIN TEMPERATURE MOISTURE
"Patient resting at present/requesting "something for pain"/Dr. Villaseñor notified.  Updated on plan of care.  Refer to flow sheet or progress note for assessment and vital signs.  Call bell in reach/patient voiced that she would call PRN.  Bed locked and in lowest position, side rail up X1.  Patient in no acute distress. Awaiting additional orders.  Will continue to monitor.    "
Discharge instructions/escript instructions given to patient, she voiced understanding.  Discharged to home in stable condition/ambulatory w steady gait to discharge window, NAD.   
warm

## 2019-11-03 NOTE — ED ADULT NURSE NOTE - OBJECTIVE STATEMENT
Patient reports he had been taking more Gabapentin than he was prescribed because the pain from his Fibromyalgia is not managed well with the old dosages. Patient states he is prescribed Gabapentin 400mg tid and had been taking it 4 times a day instead. Patient reports he has been using Gabapentin for over 3 years. Patient states he is experiencing nausea. Patient appears  very sleepy.

## 2019-11-03 NOTE — ED PROVIDER NOTE - OBJECTIVE STATEMENT
59 y/o male with a PMHx of insomnia, hyperthyroid, high cholesterol, GERD, fibromyalgia, hypothyroid, herniated cervical disc, anxiety, radiculopathy, headache, migraine, and back pain, presents to the ED c/o nausea and chest pain beginning today. Pt states that he ran out of his prescribed gabapentin. which he takes for his fibromyalgia. He says that it has been 2 days since he has last taken his medication. He is unsure of any cardiac problems, and is not under cardiologist treatment. Denies drug abuse. No EtOH. Nonsmoker. No other complaints at this time.

## 2019-11-03 NOTE — ED PROVIDER NOTE - CONSTITUTIONAL, MLM
normal... Well appearing, well nourished, awake, alert, oriented to person, place, time/situation and in no apparent distress. +vital signs stable. Afebrile.

## 2019-11-03 NOTE — ED PROVIDER NOTE - SKIN RASH DESCRIPTION
+generalized macular papular eruption with excoriated markings from scratching. No cellulitis or drainage.

## 2019-11-03 NOTE — ED PROVIDER NOTE - CLINICAL SUMMARY MEDICAL DECISION MAKING FREE TEXT BOX
Chronic pain patient ran out of gabapentin 2 days PTA and is c/o nausea and chest pain. Physical exam is unrevealing. Plan: EKG, labs, and restart gabapentin, and Pain management follow up.

## 2019-11-03 NOTE — ED ADULT NURSE NOTE - PMH
Anxiety    Back pain    Fibromyalgia    GERD (gastroesophageal reflux disease)    Headache, migraine    Herniated cervical disc    Hypothyroid    Insomnia    Radiculopathy  Rt. Arm

## 2020-10-10 NOTE — ED ADULT TRIAGE NOTE - NS ED NURSE BANDS TYPE
A/Ox4, DARSHANA with CPAP at HS, RA during day, , IS, Tele (NSR), SCD's on , on daily Lovenox, KIRAN drain to bulb suction, voiding freely to bathroom, LBM 10/4/20, regular diet, TMAX tonight 99.6, pain located to right flank area, pain managed with PO and IV m
A/oX4, DARSHANA w/ CPAP, VSS, IS, low grade temps occasionally, non-productive cough, scd's on, tele (NSR), Tegaderm to KIRAN drain insertion C/D/I, voiding freely via BR, LBM 10/4/20, pain controlled with PO and IV medications, IV Vanco and Meropenem as ordered,
Alert orientated. VSS. Kiet w/cpap at [facility]. KIRAN drain to right flank. Pain to right flank managed with oxy. Up ad pelon to the bathroom. IV antibiotics as ordered. Plan of care reviewed. Call light within reach.
Alert orientated. VSS. Kiet w/cpap at [facility]. KIRAN drain to right flank. Pain to right flank managed with oxy. Up ad pelon to the bathroom. IV antibiotics as ordered. Plan of care reviewed. Call light within reach.
CONSULT CALLED TO DR ALFARO LEFT MESSAGE.
DR ALFARO NOTIFIED AND AWARE WITH CONSULT
Dressing to drain site clean, dry, intact. Minimal serosanguinous drainage in bulb. States pain well controlled on oral pain medication. Instructed on plan of care, call for all asst needed, discomfort felt. Verbalized understanding.
POD 5 IR drain placed, Pt is AAOX4, room air, DARSHANA CPAP, TELE, VSS, PO meds for pain, voiding freely to restroom, up SBA, IV SL, KIRAN drain to suction to Rt lateral flank, no output at shift start, SCD's, Lovenox, generalized edema, better after lasix, IV ABX
PT AOX4 this PM. VSS on RA during day. DARSHANA CPAP at night. Tele per protocol. , IS, has dry chronic cough. Electrolyte protocol, K+ 3.4, replaced tonight. NPO at midnight, 1800 ADA diet normally. Pain in R flank radiating to back, no bruising noted.  Up s
Plan of care d/w patient at bedside this am. Picc line placed to left upper arm this afternoon. To d/c home when ok with hospitalist, spouse to come this afternoon. Rt flank drsg remains intact with demetrius drain in place with scant brownish drainage noted.  Sti
Plan of care discussed with patient this am. IVF infusing as ordered. Rt back drsg clean and dry. Mat drain intact with small amt of drainage noted. Rating pain mild this am, denies need for pain meds. Up ad pelon.  K level low today, replacing per electrolyte
Plan of care discussed with patient this am. Remains NPO. IVF infusing as ordered. Winston pain meds given as ordered for rt flank pain. Denies nausea. Rn contact CT/IR/US this am, Procedure to be done later this afternoon. Patient updated. Will monitor.
RECD ALERT ,AWAKE, ORIENTED. COMFORTABLE NO N/V DENIES DIFFICULTY URINATING. KIRAN TO SUCTION NO DRAIN.  CALL LIGHT W/IN REACH INSTRUCTED TO CALL FOR ALL NEEDS AMD ASSISTANCE
States pain well controlled on oral pain medication. Iv antibiotics infusing. Right  flank drain site & dressing clean, dry, intact. Small amount of serosanguinous drainage in  tubing and bulb.   Instructed on plan of care, call don't fall protocol, call
Name band;

## 2020-11-01 ENCOUNTER — INPATIENT (INPATIENT)
Facility: HOSPITAL | Age: 60
LOS: 2 days | Discharge: HOME | DRG: 556 | End: 2020-11-04
Attending: INTERNAL MEDICINE | Admitting: INTERNAL MEDICINE
Payer: COMMERCIAL

## 2020-11-01 ENCOUNTER — EMERGENCY (EMERGENCY)
Facility: HOSPITAL | Age: 60
LOS: 1 days | Discharge: ANOTHER HOSPITAL | End: 2020-11-01
Attending: EMERGENCY MEDICINE | Admitting: EMERGENCY MEDICINE
Payer: COMMERCIAL

## 2020-11-01 VITALS
SYSTOLIC BLOOD PRESSURE: 115 MMHG | HEART RATE: 81 BPM | DIASTOLIC BLOOD PRESSURE: 75 MMHG | OXYGEN SATURATION: 97 % | TEMPERATURE: 98 F | RESPIRATION RATE: 16 BRPM

## 2020-11-01 VITALS
OXYGEN SATURATION: 93 % | RESPIRATION RATE: 18 BRPM | TEMPERATURE: 100 F | SYSTOLIC BLOOD PRESSURE: 125 MMHG | HEIGHT: 64 IN | HEART RATE: 105 BPM | DIASTOLIC BLOOD PRESSURE: 72 MMHG | WEIGHT: 149.91 LBS

## 2020-11-01 VITALS
HEART RATE: 78 BPM | SYSTOLIC BLOOD PRESSURE: 119 MMHG | TEMPERATURE: 98 F | HEIGHT: 64 IN | RESPIRATION RATE: 16 BRPM | DIASTOLIC BLOOD PRESSURE: 76 MMHG | WEIGHT: 115.08 LBS | OXYGEN SATURATION: 98 %

## 2020-11-01 DIAGNOSIS — R26.2 DIFFICULTY IN WALKING, NOT ELSEWHERE CLASSIFIED: ICD-10-CM

## 2020-11-01 DIAGNOSIS — E05.90 THYROTOXICOSIS, UNSPECIFIED WITHOUT THYROTOXIC CRISIS OR STORM: ICD-10-CM

## 2020-11-01 DIAGNOSIS — K21.9 GASTRO-ESOPHAGEAL REFLUX DISEASE WITHOUT ESOPHAGITIS: ICD-10-CM

## 2020-11-01 DIAGNOSIS — Z98.890 OTHER SPECIFIED POSTPROCEDURAL STATES: Chronic | ICD-10-CM

## 2020-11-01 DIAGNOSIS — Z29.9 ENCOUNTER FOR PROPHYLACTIC MEASURES, UNSPECIFIED: ICD-10-CM

## 2020-11-01 DIAGNOSIS — M25.559 PAIN IN UNSPECIFIED HIP: ICD-10-CM

## 2020-11-01 DIAGNOSIS — I10 ESSENTIAL (PRIMARY) HYPERTENSION: ICD-10-CM

## 2020-11-01 DIAGNOSIS — M79.7 FIBROMYALGIA: ICD-10-CM

## 2020-11-01 DIAGNOSIS — F41.9 ANXIETY DISORDER, UNSPECIFIED: ICD-10-CM

## 2020-11-01 PROBLEM — E78.00 PURE HYPERCHOLESTEROLEMIA, UNSPECIFIED: Chronic | Status: ACTIVE | Noted: 2019-11-03

## 2020-11-01 LAB
ANION GAP SERPL CALC-SCNC: 9 MMOL/L — SIGNIFICANT CHANGE UP (ref 5–17)
BASOPHILS # BLD AUTO: 0.06 K/UL — SIGNIFICANT CHANGE UP (ref 0–0.2)
BASOPHILS NFR BLD AUTO: 0.8 % — SIGNIFICANT CHANGE UP (ref 0–2)
BUN SERPL-MCNC: 23 MG/DL — SIGNIFICANT CHANGE UP (ref 7–23)
CALCIUM SERPL-MCNC: 9 MG/DL — SIGNIFICANT CHANGE UP (ref 8.4–10.5)
CHLORIDE SERPL-SCNC: 103 MMOL/L — SIGNIFICANT CHANGE UP (ref 96–108)
CO2 SERPL-SCNC: 27 MMOL/L — SIGNIFICANT CHANGE UP (ref 22–31)
CREAT SERPL-MCNC: 1.12 MG/DL — SIGNIFICANT CHANGE UP (ref 0.5–1.3)
EOSINOPHIL # BLD AUTO: 0.96 K/UL — HIGH (ref 0–0.5)
EOSINOPHIL NFR BLD AUTO: 12.2 % — HIGH (ref 0–6)
GLUCOSE SERPL-MCNC: 99 MG/DL — SIGNIFICANT CHANGE UP (ref 70–99)
HCT VFR BLD CALC: 40.1 % — SIGNIFICANT CHANGE UP (ref 39–50)
HGB BLD-MCNC: 13.3 G/DL — SIGNIFICANT CHANGE UP (ref 13–17)
IMM GRANULOCYTES NFR BLD AUTO: 0.3 % — SIGNIFICANT CHANGE UP (ref 0–1.5)
LYMPHOCYTES # BLD AUTO: 1.77 K/UL — SIGNIFICANT CHANGE UP (ref 1–3.3)
LYMPHOCYTES # BLD AUTO: 22.5 % — SIGNIFICANT CHANGE UP (ref 13–44)
MCHC RBC-ENTMCNC: 30.1 PG — SIGNIFICANT CHANGE UP (ref 27–34)
MCHC RBC-ENTMCNC: 33.2 GM/DL — SIGNIFICANT CHANGE UP (ref 32–36)
MCV RBC AUTO: 90.7 FL — SIGNIFICANT CHANGE UP (ref 80–100)
MONOCYTES # BLD AUTO: 1.25 K/UL — HIGH (ref 0–0.9)
MONOCYTES NFR BLD AUTO: 15.9 % — HIGH (ref 2–14)
NEUTROPHILS # BLD AUTO: 3.8 K/UL — SIGNIFICANT CHANGE UP (ref 1.8–7.4)
NEUTROPHILS NFR BLD AUTO: 48.3 % — SIGNIFICANT CHANGE UP (ref 43–77)
NRBC # BLD: 0 /100 WBCS — SIGNIFICANT CHANGE UP (ref 0–0)
PLATELET # BLD AUTO: 200 K/UL — SIGNIFICANT CHANGE UP (ref 150–400)
POTASSIUM SERPL-MCNC: 3.9 MMOL/L — SIGNIFICANT CHANGE UP (ref 3.5–5.3)
POTASSIUM SERPL-SCNC: 3.9 MMOL/L — SIGNIFICANT CHANGE UP (ref 3.5–5.3)
RBC # BLD: 4.42 M/UL — SIGNIFICANT CHANGE UP (ref 4.2–5.8)
RBC # FLD: 12.5 % — SIGNIFICANT CHANGE UP (ref 10.3–14.5)
SARS-COV-2 IGG SERPL QL IA: NEGATIVE — SIGNIFICANT CHANGE UP
SARS-COV-2 IGM SERPL IA-ACNC: 0.08 INDEX — SIGNIFICANT CHANGE UP
SARS-COV-2 RNA SPEC QL NAA+PROBE: SIGNIFICANT CHANGE UP
SODIUM SERPL-SCNC: 139 MMOL/L — SIGNIFICANT CHANGE UP (ref 135–145)
WBC # BLD: 7.86 K/UL — SIGNIFICANT CHANGE UP (ref 3.8–10.5)
WBC # FLD AUTO: 7.86 K/UL — SIGNIFICANT CHANGE UP (ref 3.8–10.5)

## 2020-11-01 PROCEDURE — 80048 BASIC METABOLIC PNL TOTAL CA: CPT

## 2020-11-01 PROCEDURE — 93010 ELECTROCARDIOGRAM REPORT: CPT

## 2020-11-01 PROCEDURE — 36415 COLL VENOUS BLD VENIPUNCTURE: CPT

## 2020-11-01 PROCEDURE — 99284 EMERGENCY DEPT VISIT MOD MDM: CPT

## 2020-11-01 PROCEDURE — 72192 CT PELVIS W/O DYE: CPT

## 2020-11-01 PROCEDURE — 72170 X-RAY EXAM OF PELVIS: CPT

## 2020-11-01 PROCEDURE — 93005 ELECTROCARDIOGRAM TRACING: CPT

## 2020-11-01 PROCEDURE — 85025 COMPLETE CBC W/AUTO DIFF WBC: CPT

## 2020-11-01 PROCEDURE — 99283 EMERGENCY DEPT VISIT LOW MDM: CPT

## 2020-11-01 PROCEDURE — 72192 CT PELVIS W/O DYE: CPT | Mod: 26

## 2020-11-01 PROCEDURE — 72170 X-RAY EXAM OF PELVIS: CPT | Mod: 26

## 2020-11-01 PROCEDURE — 71045 X-RAY EXAM CHEST 1 VIEW: CPT | Mod: 26

## 2020-11-01 PROCEDURE — 99285 EMERGENCY DEPT VISIT HI MDM: CPT | Mod: 25

## 2020-11-01 PROCEDURE — 71045 X-RAY EXAM CHEST 1 VIEW: CPT

## 2020-11-01 RX ORDER — CYCLOBENZAPRINE HYDROCHLORIDE 10 MG/1
1 TABLET, FILM COATED ORAL
Qty: 0 | Refills: 0 | DISCHARGE

## 2020-11-01 RX ORDER — OXYCODONE HYDROCHLORIDE 5 MG/1
1 TABLET ORAL
Qty: 0 | Refills: 0 | DISCHARGE

## 2020-11-01 RX ORDER — METOPROLOL TARTRATE 50 MG
100 TABLET ORAL
Refills: 0 | Status: DISCONTINUED | OUTPATIENT
Start: 2020-11-01 | End: 2020-11-02

## 2020-11-01 RX ORDER — OXYCODONE HYDROCHLORIDE 5 MG/1
0 TABLET ORAL
Qty: 0 | Refills: 0 | DISCHARGE

## 2020-11-01 RX ORDER — GABAPENTIN 400 MG/1
1 CAPSULE ORAL
Qty: 0 | Refills: 0 | DISCHARGE

## 2020-11-01 RX ORDER — TIZANIDINE 4 MG/1
2 TABLET ORAL
Qty: 0 | Refills: 0 | DISCHARGE

## 2020-11-01 RX ORDER — ENOXAPARIN SODIUM 100 MG/ML
40 INJECTION SUBCUTANEOUS AT BEDTIME
Refills: 0 | Status: DISCONTINUED | OUTPATIENT
Start: 2020-11-01 | End: 2020-11-04

## 2020-11-01 RX ORDER — KETOROLAC TROMETHAMINE 30 MG/ML
15 SYRINGE (ML) INJECTION ONCE
Refills: 0 | Status: DISCONTINUED | OUTPATIENT
Start: 2020-11-01 | End: 2020-11-01

## 2020-11-01 RX ORDER — GABAPENTIN 400 MG/1
400 CAPSULE ORAL THREE TIMES A DAY
Refills: 0 | Status: DISCONTINUED | OUTPATIENT
Start: 2020-11-01 | End: 2020-11-04

## 2020-11-01 RX ORDER — OXYCODONE HYDROCHLORIDE 5 MG/1
10 TABLET ORAL EVERY 6 HOURS
Refills: 0 | Status: DISCONTINUED | OUTPATIENT
Start: 2020-11-01 | End: 2020-11-04

## 2020-11-01 RX ORDER — RABEPRAZOLE 20 MG/1
1 TABLET, DELAYED RELEASE ORAL
Qty: 0 | Refills: 0 | DISCHARGE

## 2020-11-01 RX ORDER — ONDANSETRON 8 MG/1
1 TABLET, FILM COATED ORAL
Qty: 0 | Refills: 0 | DISCHARGE

## 2020-11-01 RX ORDER — METOPROLOL TARTRATE 50 MG
1 TABLET ORAL
Qty: 0 | Refills: 0 | DISCHARGE

## 2020-11-01 RX ORDER — CYCLOBENZAPRINE HYDROCHLORIDE 10 MG/1
10 TABLET, FILM COATED ORAL THREE TIMES A DAY
Refills: 0 | Status: DISCONTINUED | OUTPATIENT
Start: 2020-11-01 | End: 2020-11-04

## 2020-11-01 RX ORDER — ONDANSETRON 8 MG/1
4 TABLET, FILM COATED ORAL EVERY 8 HOURS
Refills: 0 | Status: DISCONTINUED | OUTPATIENT
Start: 2020-11-01 | End: 2020-11-04

## 2020-11-01 RX ORDER — OXYCODONE HYDROCHLORIDE 5 MG/1
15 TABLET ORAL ONCE
Refills: 0 | Status: DISCONTINUED | OUTPATIENT
Start: 2020-11-01 | End: 2020-11-01

## 2020-11-01 RX ADMIN — ENOXAPARIN SODIUM 40 MILLIGRAM(S): 100 INJECTION SUBCUTANEOUS at 21:09

## 2020-11-01 RX ADMIN — OXYCODONE HYDROCHLORIDE 15 MILLIGRAM(S): 5 TABLET ORAL at 03:26

## 2020-11-01 RX ADMIN — Medication 15 MILLIGRAM(S): at 21:00

## 2020-11-01 RX ADMIN — GABAPENTIN 400 MILLIGRAM(S): 400 CAPSULE ORAL at 21:09

## 2020-11-01 RX ADMIN — GABAPENTIN 400 MILLIGRAM(S): 400 CAPSULE ORAL at 14:05

## 2020-11-01 RX ADMIN — OXYCODONE HYDROCHLORIDE 15 MILLIGRAM(S): 5 TABLET ORAL at 03:38

## 2020-11-01 RX ADMIN — Medication 15 MILLIGRAM(S): at 20:35

## 2020-11-01 NOTE — H&P ADULT - PROBLEM SELECTOR PLAN 1
Patient presenting with L groin/hip pain s/p mechanical fall after slipping on water while getting out of tub  - Admit to [facility]  - Xray: negative for acute fracture, CTpelvis osteopenia, no fracture noted  - Patient with severe pain on ambulation  - PT consulted, f/u recs  - Pain regimen: takes oxycodone 15mg q6 hours will continue standing

## 2020-11-01 NOTE — ED PROVIDER NOTE - CARE PLAN
Principal Discharge DX:	Hip pain  Secondary Diagnosis:	Groin pain, left  Secondary Diagnosis:	Inability to ambulate due to left hip

## 2020-11-01 NOTE — ED ADULT NURSE REASSESSMENT NOTE - NS ED NURSE REASSESS COMMENT FT1
pt report received from nightshift, pt sleeping on stretcher, aox3, vss, still c/o left hip/groin pain, fall and other safety precautions in place, pt to be transferred to another facility.

## 2020-11-01 NOTE — ED PROVIDER NOTE - CLINICAL SUMMARY MEDICAL DECISION MAKING FREE TEXT BOX
Pt is a 60 yo male who presents to the ED with a cc of left groin pain.  Anxiety, Back pain, Fibromyalgia, GERD (gastroesophageal reflux disease)    Headache, migraine, Herniated cervical disc, High cholesterol, HTN (hypertension), Hyperthyroidism, insomnia, Radiculopathy  Rt. Arm.  Pt reports that yesterday evening around 9-10 pm he suffered a mechanical fall slipping in the restroom.  He reports that he felt a pull/pain in his left groin after he struck this on the edge of the tub. Pt did not strike his head and denies LOC.  He was able to crawl to where he had crutches but reported severe pain upon attempting to bear weight on left leg. EMS was called and pt was taken to .  He underwent lab elv and x-ray of his chest and pelvis.  No acute fracture was noted but due to severe pain CT pelvis was ordered. Again no acute fracture was noted but osteopenia limited elv. Pt was given pain medication and ambulation attempt with walker was made. He reported severe pain and appeared unsteady with walker and so he was transferred to [facility] for admission. Pt reports continued groin pain on weight bearing. Denies HA, N/V, CP, SOB, abd pain.  he has chronic neck and back pain per reports which is at baseline. Denies numbness in ext, denies loss of bowel or bladder control.  chart review, pt medically stable at this time.  Has been accepted to Dr. Perlman's service

## 2020-11-01 NOTE — ED PROVIDER NOTE - OBJECTIVE STATEMENT
Pt is a 58 yo male who presents to the ED with a cc of left groin pain.  Anxiety, Back pain, Fibromyalgia, GERD (gastroesophageal reflux disease)    Headache, migraine, Herniated cervical disc, High cholesterol, HTN (hypertension), Hyperthyroidism, insomnia, Radiculopathy  Rt. Arm.  Pt reports that yesterday evening around 9-10 pm he suffered a mechanical fall slipping in the restroom.  He reports that he felt a pull/pain in his left groin after he struck this on the edge of the tub. Pt did not strike his head and denies LOC.  He was able to crawl to where he had crutches but reported severe pain upon attempting to bear weight on left leg. EMS was called and pt was taken to .  He underwent lab elv and x-ray of his chest and pelvis.  No acute fracture was noted but due to severe pain CT pelvis was ordered. Again no acute fracture was noted but osteopenia limited elv. Pt was given pain medication and ambulation attempt with walker was made. He reported severe pain and appeared unsteady with walker and so he was transferred to [facility] for admission. Pt reports continued groin pain on weight bearing. Denies HA, N/V, CP, SOB, abd pain.  he has chronic neck and back pain per reports which is at baseline. Denies numbness in ext, denies loss of bowel or bladder control.

## 2020-11-01 NOTE — H&P ADULT - NSHPSOCIALHISTORY_GEN_ALL_CORE
Alcohol: Admits to drinking 1-2 beers every other day  Tobacco: Denies past or present use  Drugs: Denies past or present use    Lives alone  Ambulates independently   Performs ADLs without assistance    Code Status: FULL CODE    Flu Vaccine: Does not accept  PNA Vaccine: Does not accept

## 2020-11-01 NOTE — H&P ADULT - PROBLEM SELECTOR PLAN 2
Chronic  - Takes metoprolol 100mg BID  - Continue inpatient with hold parameters Chronic  - Takes metoprolol 100mg BID  - BP stable   - Continue inpatient with hold parameters

## 2020-11-01 NOTE — ED PROVIDER NOTE - PMH
Anxiety    Back pain    Fibromyalgia    GERD (gastroesophageal reflux disease)    Headache, migraine    Herniated cervical disc    High cholesterol    HTN (hypertension)    Hyperthyroidism    Insomnia    Radiculopathy  Rt. Arm

## 2020-11-01 NOTE — PROVIDER CONTACT NOTE (MEDICATION) - RECOMMENDATIONS
Would you order a one time IV push Toradol for pain if you deem it appropriate? I will also be doing a bladder scan and will call you with the result.

## 2020-11-01 NOTE — H&P ADULT - NSHPREVIEWOFSYSTEMS_GEN_ALL_CORE
CONSTITUTIONAL: denies fever, chills, fatigue, weakness  HEENT: denies blurred vision, sore throat  SKIN: denies new lesions, rash  CARDIOVASCULAR: denies chest pain, chest pressure, palpitations  RESPIRATORY: denies shortness of breath, sputum production  GASTROINTESTINAL: denies nausea, vomiting, diarrhea, abdominal pain  GENITOURINARY: denies dysuria, discharge  NEUROLOGICAL: denies numbness, headache, focal weakness  MUSCULOSKELETAL: Admits L groin pain, radiating to lower back  HEMATOLOGIC: denies gross bleeding, bruising  LYMPHATICS: denies enlarged lymph nodes, extremity swelling  PSYCHIATRIC: denies recent changes in anxiety, depression  ENDOCRINOLOGIC: denies sweating, cold or heat intolerance

## 2020-11-01 NOTE — ED PROVIDER NOTE - CLINICAL SUMMARY MEDICAL DECISION MAKING FREE TEXT BOX
s/p mechanical fall a few hours ago onto edge of bathtub, c/o left pelvic pain and difficulty bearing weight - needs imaging to r/o fx

## 2020-11-01 NOTE — ED PROVIDER NOTE - PROGRESS NOTE DETAILS
d/w pt that xr pelvis appears negative, given that pt unable to bear weight left LE will do CT to see if there is fx pt walked with walker, hurts to bear weight still, will provide one dose oxycodone 15mg (which he takes at home, last dose at least 6hr ago), will reassess in 30-60minn had been sleeping, now when woken and reassessed pt continues to c/o significant pain left groin, unable to bear weight, almost collapsing when trying to walk, does not appear steady with walker, concern that would be a further fall risk with crutches, will d/w Dr. Velásquez ([facility]) for admission for PT/MRI at [place], does not appear safe/stable to go home at this time d/w perlman ([facility]) will admit pt at [place]. d/w jerry ([facility]) will accept xfer er->er

## 2020-11-01 NOTE — H&P ADULT - PROBLEM SELECTOR PLAN 4
Chronic  - s/p Nissen fundoplication  - Takes Zofran for nausea, will continue inpatient Chronic  - s/p Nissen fundoplication  - Takes Zofran for nausea, will continue inpatient  - Per patient does not use any OTC acid reducers

## 2020-11-01 NOTE — ED PROVIDER NOTE - OBJECTIVE STATEMENT
59 y.o. M c/o left groin pain, states he slipped in the bathroom, had water on the floor after shower, and hit into tub edge with left side, was not able to stand due to left groin pain, crawled to next room where he had crutches, has had persistent pain, called EMS to bring him to ED for eval, denies hitting head, denies neck/back pain

## 2020-11-01 NOTE — ED ADULT NURSE NOTE - PMH
Mast one print, has FU in oct Anxiety    Back pain    Fibromyalgia    GERD (gastroesophageal reflux disease)    Headache, migraine    Herniated cervical disc    High cholesterol    HTN (hypertension)    Hyperthyroidism    Insomnia    Radiculopathy  Rt. Arm

## 2020-11-01 NOTE — H&P ADULT - HISTORY OF PRESENT ILLNESS
58 yo male with PMH of Anxiety, back pain, fibromyalgia, GERD, HA, migraines, HTN, HLD, hyperthyroidism, radiculopathy presents to the ED transferred from [place] s/p mechanical fall on left hip with severe left groin pain. Patient states yesterday night he fell after slipping in the restroom. He felt a pull in his groin after hitting the edge of his tub. He denies any chest pain, palpitations dizziness before the fall and denies LOC. He denies hitting his head. EMS was called and brought him to [facility]. Patient underwent X-Ray of chest and pelvis and no fracture was found in the pelvis. Patient continued to have severe pain and CT pelvis was ordered, again not finding fracture. Patient was given pain medications and attempted to walk with waker but had severe pain. He is admitted for PT.     In the ED:  Vitals: Temp 98.4F | HR 78 | /76 | RR 16 | SpO2 98% RA  CBC WNL  BMP WNL  CT pelvis: Osteopenia limits fracture detection. No acute displaced fracture or dislocation. If strong clinical concern persists, nuclear bone scan or MRI may be considered.         58 yo male with PMH of HTN, HLD, hyperthyroidism, Anxiety, chronic back pain, fibromyalgia, GERD, HA, migraines, cervical radiculopathy presents to the ED transferred from [place] s/p mechanical fall on left hip with severe left groin pain. Patient states yesterday night around 9:30 PM he was taking a bath, and when he tried to get out of the bathtub he slipped on some water and fell hitting the front of his left hip on the side of the tub. He felt a pull in his groin after hitting the edge of his tub. He denies any chest pain, palpitations dizziness before the fall and denies LOC. He denies hitting his head. He was able to stand up without putting weight on his left leg. The pain continued to worsen and he called EMS who brought him to [place] ED. Patient underwent X-Ray of chest and pelvis and no fracture was found in the pelvis. Patient continued to have severe pain and CT pelvis was ordered, again not finding fracture. Patient was given pain medications and attempted to walk with PT but had severe pain. Pain is currently a 4/10 on the pain scale and radiates to his lower back and is stabbing in nature, relieved by oxycodone.     In the ED:  Vitals: Temp 98.4F | HR 78 | /76 | RR 16 | SpO2 98% RA  CBC WNL  BMP WNL  CXR: The heart is unremarkable. The lungs are clear. Bones are unremarkable for age.  X-ray pelvis: No acute fracture or dislocation. Joint spaces maintained.  CT pelvis: Osteopenia limits fracture detection. No acute displaced fracture or dislocation. If strong clinical concern persists, nuclear bone scan or MRI may be considered.  EKG: NSR, nonspecific T wave abnormality, QTc 443  S/p oxycodone     58 yo male with PMH of HTN, hyperthyroidism, Anxiety, chronic back pain, fibromyalgia, GERD, HA, migraines, cervical radiculopathy presents to the ED transferred from [place] s/p mechanical fall on left hip with severe left groin pain. Patient states yesterday night around 9:30 PM he was taking a bath, and when he tried to get out of the bathtub he slipped on some water and fell hitting the front of his left hip on the side of the tub. He felt a pull in his groin after hitting the edge of his tub. He denies any chest pain, palpitations dizziness before the fall and denies LOC. He denies hitting his head. He was able to stand up without putting weight on his left leg. The pain continued to worsen and he called EMS who brought him to [place] ED. Patient underwent X-Ray of chest and pelvis and no fracture was found in the pelvis. Patient continued to have severe pain and CT pelvis was ordered, again not finding fracture. Patient was given pain medications and attempted to walk with PT but had severe pain. Pain is currently a 4/10 on the pain scale and radiates to his lower back and is stabbing in nature, relieved by oxycodone.     In the ED:  Vitals: Temp 98.4F | HR 78 | /76 | RR 16 | SpO2 98% RA  CBC WNL  BMP WNL  CXR: The heart is unremarkable. The lungs are clear. Bones are unremarkable for age.  X-ray pelvis: No acute fracture or dislocation. Joint spaces maintained.  CT pelvis: Osteopenia limits fracture detection. No acute displaced fracture or dislocation. If strong clinical concern persists, nuclear bone scan or MRI may be considered.  EKG: NSR, nonspecific T wave abnormality, QTc 443  S/p oxycodone

## 2020-11-01 NOTE — H&P ADULT - PROBLEM SELECTOR PLAN 3
Chronic  - Patient takes metoprolol 100mg BID due to palpitations from hyperthyroidism Chronic  - Patient takes metoprolol 100mg BID due to palpitations from hyperthyroidism  - Denies taking any thyroid medications

## 2020-11-01 NOTE — CHART NOTE - NSCHARTNOTEFT_GEN_A_CORE
I-STOP Performed    Patient Name: Long Guillen YOB: 1960  Address: [address] [place] [address]  [place], [place] [address]Eyq: Male  Rx Written	Rx Dispensed	Drug	Quantity	Days Supply	Prescriber Name	Payment Method	Dispenser  10/27/2020	10/28/2020	oxycodone hcl 15 mg tablet	120	30	Rosales Lu MD	Insurance	[facility] #[id number]  09/29/2020	09/29/2020	oxycodone hcl 15 mg tablet	120	30	Rosales Lu MD	Insurance	[facility] #[id number]  09/01/2020	09/01/2020	oxycodone hcl 15 mg tablet	120	30	Claudio Lang MD	Insurance	[facility] #[id number]  08/04/2020	08/04/2020	oxycodone hcl 15 mg tablet	120	30	Rosales Lu MD	Insurance	[facility] #[id number]  07/01/2020	07/02/2020	oxycodone hcl 15 mg tablet	120	30	GreenClaudio MD	Insurance	[facility] #[id number]  06/02/2020	06/05/2020	oxycodone hcl 15 mg tablet	120	30	Rosales Lu MD	Insurance	[facility] #[id number]  05/05/2020	05/09/2020	oxycodone hcl 15 mg tablet	120	30	Rosales Lu MD	Insurance	[facility] #[id number]  04/09/2020	04/12/2020	oxycodone hcl 15 mg tablet	120	30	Rosales Lu MD	Insurance	[facility] #[id number]  03/10/2020	03/16/2020	oxycodone hcl 15 mg tablet	120	30	Rosales Lu MD	Insurance	[facility] #[id number]  02/18/2020	02/18/2020	oxycodone hcl 15 mg tablet	120	30	Rosales Lu MD	Insurance	[facility] #[id number]  01/22/2020	01/22/2020	oxycodone hcl 15 mg tablet	120	30	Rosales Lu MD	Insurance	[facility] #[id number]  12/24/2019	12/24/2019	oxycodone hcl 15 mg tablet	120	30	Rosales Lu MD	Insurance	[facility] #[id number]  11/25/2019	11/25/2019	oxycodone hcl 15 mg tablet	120	30	Rosales Lu MD	Insurance	[facility] #[id number]      Brandy Mcgill DO  PGY-1  [facility] #[id number]

## 2020-11-01 NOTE — ED ADULT NURSE NOTE - CHIEF COMPLAINT QUOTE
pt transferred from [facility] for difficulty ambulating s/p fall, sent for admit, no fx found

## 2020-11-01 NOTE — H&P ADULT - NSICDXPASTSURGICALHX_GEN_ALL_CORE_FT
PAST SURGICAL HISTORY:  S/P hernia repair B/L     PAST SURGICAL HISTORY:  History of Nissen fundoplication     S/P hernia repair B/L    S/P rhinoplasty

## 2020-11-01 NOTE — H&P ADULT - NSHPPHYSICALEXAM_GEN_ALL_CORE
T(C): 37 (11-01-20 @ 11:15), Max: 37.5 (11-01-20 @ 01:20)  HR: 76 (11-01-20 @ 11:15) (76 - 105)  BP: 120/78 (11-01-20 @ 11:15) (115/75 - 122/72)  RR: 16 (11-01-20 @ 11:15) (16 - 18)  SpO2: 99% (11-01-20 @ 11:15) (93% - 99%)    GENERAL: patient appears well, no acute distress, appropriate, pleasant, thin   EYES: sclera clear, no exudates  ENMT: oropharynx clear without erythema, no exudates, moist mucous membranes, poor dentition  NECK: supple, soft, no thyromegaly noted  LUNGS: good air entry bilaterally, clear to auscultation, symmetric breath sounds, no wheezing or rhonchi appreciated  HEART: soft S1/S2, regular rate and rhythm, no murmurs noted, no lower extremity edema  GASTROINTESTINAL: abdomen is soft, mild TTP in the RLQ, nondistended, normoactive bowel sounds, no palpable masses  INTEGUMENT: good skin turgor, no lesions noted  MUSCULOSKELETAL: no clubbing or cyanosis, no obvious deformity  NEUROLOGIC: awake, alert, oriented x3, good muscle tone in 4 extremities, no obvious sensory deficits  PSYCHIATRIC: mood is good, affect is congruent, linear and logical thought process  HEME/LYMPH: no palpable supraclavicular nodules, no obvious ecchymosis or petechiae T(C): 37 (11-01-20 @ 11:15), Max: 37.5 (11-01-20 @ 01:20)  HR: 76 (11-01-20 @ 11:15) (76 - 105)  BP: 120/78 (11-01-20 @ 11:15) (115/75 - 122/72)  RR: 16 (11-01-20 @ 11:15) (16 - 18)  SpO2: 99% (11-01-20 @ 11:15) (93% - 99%)    GENERAL: patient appears well, no acute distress, appropriate, pleasant, thin   EYES: sclera clear, no exudates  ENMT: oropharynx clear without erythema, no exudates, moist mucous membranes, poor dentition  NECK: supple, soft, no thyromegaly noted  LUNGS: good air entry bilaterally, clear to auscultation, symmetric breath sounds, no wheezing or rhonchi appreciated  HEART: soft S1/S2, regular rate and rhythm, no murmurs noted, no lower extremity edema  GASTROINTESTINAL: abdomen is soft, mild TTP in the RLQ, nondistended, normoactive bowel sounds, no palpable masses  GENITOURINARY: TTP of the left groin, no hernia appreciated  INTEGUMENT: good skin turgor, no lesions noted  MUSCULOSKELETAL: no clubbing or cyanosis, no obvious deformity, 2+ peripheral pulses, ROM of LLE limited to pain  NEUROLOGIC: awake, alert, oriented x3, CNII-XII grossly intact, good muscle tone in 4 extremities, sensation intact  PSYCHIATRIC: mood is good, affect is congruent, linear and logical thought process  HEME/LYMPH: no palpable supraclavicular nodules, no obvious ecchymosis or petechiae T(C): 37 (11-01-20 @ 11:15), Max: 37.5 (11-01-20 @ 01:20)  HR: 76 (11-01-20 @ 11:15) (76 - 105)  BP: 120/78 (11-01-20 @ 11:15) (115/75 - 122/72)  RR: 16 (11-01-20 @ 11:15) (16 - 18)  SpO2: 99% (11-01-20 @ 11:15) (93% - 99%)    GENERAL: patient appears well, no acute distress, appropriate, pleasant, thin   EYES: sclera clear, no exudates  ENMT: oropharynx clear without erythema, no exudates, moist mucous membranes, poor dentition  NECK: supple, soft, no thyromegaly noted  LUNGS: good air entry bilaterally, clear to auscultation, symmetric breath sounds, no wheezing or rhonchi appreciated  HEART: soft S1/S2, regular rate and rhythm, no murmurs noted, no lower extremity edema  GASTROINTESTINAL: abdomen is soft, mild TTP in the RLQ, nondistended, normoactive bowel sounds, no palpable masses  GENITOURINARY: TTP of the left groin, no hernia appreciated  INTEGUMENT: good skin turgor, multiple scabbed lesions on the back with surrounding erythema noted   MUSCULOSKELETAL: no clubbing or cyanosis, no obvious deformity, 2+ peripheral pulses, ROM of LLE limited to pain  NEUROLOGIC: awake, alert, oriented x3, CNII-XII grossly intact, good muscle tone in 4 extremities, sensation intact  PSYCHIATRIC: mood is good, affect is congruent, linear and logical thought process  HEME/LYMPH: no palpable supraclavicular nodules, no obvious ecchymosis or petechiae

## 2020-11-01 NOTE — ED ADULT NURSE NOTE - CHPI ED NUR SYMPTOMS NEG
no fever/no numbness/no tingling/no stiffness/no deformity/no abrasion/no weakness/no back pain/no bruising

## 2020-11-01 NOTE — PROVIDER CONTACT NOTE (OTHER) - BACKGROUND
Pt is unable to void via external catheter, sitting at side of bed or standing with assistance at the side of the bed.

## 2020-11-01 NOTE — ED PROVIDER NOTE - MUSCULOSKELETAL, MLM
FROM, appears in pain when ranging left hip; left LE not shortened, not abnormally rotated, no bony ttp; +ttp left inguinal area

## 2020-11-01 NOTE — H&P ADULT - ASSESSMENT
58 yo male with PMH of HTN, HLD, hyperthyroidism, Anxiety, chronic back pain, fibromyalgia, GERD, HA, migraines, cervical radiculopathy presents to the ED transferred from [place] s/p mechanical fall on left hip admitted with severe left groin pain and inability to ambulate.

## 2020-11-01 NOTE — H&P ADULT - PROBLEM SELECTOR PLAN 5
Chronic   - Patient takes gabapentin 400mg TID, Cyclobenzaprine 10mg TID, tizanadine 4mg 2 tabs qhs  - Continue inpatient Chronic   - Patient takes gabapentin 400mg TID, Cyclobenzaprine 10mg TID  - Stopped taking tizanadine 2 weeks ago   - Continue inpatient Chronic   - Patient takes gabapentin 400mg TID, Cyclobenzaprine 10mg TID  - Stopped taking tizanadine 2 weeks ago   - Continue inpatient  - I-STOP performed, see chart note

## 2020-11-01 NOTE — H&P ADULT - NSICDXFAMILYHX_GEN_ALL_CORE_FT
FAMILY HISTORY:  Sibling  Still living? Unknown  Family history of acute myocardial infarction, Age at diagnosis: Age Unknown     FAMILY HISTORY:  FHx: myocardial infarction, Sibling, Parents, Maternal and Paternal Grandfathers  FHx: stroke, Mother

## 2020-11-01 NOTE — ED ADULT NURSE NOTE - PMH
Anxiety    Back pain    Fibromyalgia    GERD (gastroesophageal reflux disease)    Headache, migraine    Herniated cervical disc    High cholesterol    HTN (hypertension)    Hyperthyroidism    Hypothyroid    Insomnia    Radiculopathy  Rt. Arm   Anxiety    Back pain    Fibromyalgia    GERD (gastroesophageal reflux disease)    Headache, migraine    Herniated cervical disc    High cholesterol    HTN (hypertension)    Hyperthyroidism    Insomnia    Radiculopathy  Rt. Arm

## 2020-11-01 NOTE — PROVIDER CONTACT NOTE (MEDICATION) - ASSESSMENT
Has pain of 6-7/10 in his groin. Pt also states that he has not urinated since calling the ambulance which is greater than 20 hrs ago.

## 2020-11-01 NOTE — H&P ADULT - NSICDXPASTMEDICALHX_GEN_ALL_CORE_FT
PAST MEDICAL HISTORY:  Anxiety     Back pain     Fibromyalgia     GERD (gastroesophageal reflux disease)     Headache, migraine     Herniated cervical disc     High cholesterol     HTN (hypertension)     Hyperthyroidism     Insomnia     Radiculopathy Rt. Arm

## 2020-11-01 NOTE — ED ADULT NURSE NOTE - CHPI ED NUR CONTEXT2
Chief Complaint   Patient presents with    Sports Physical     1. Have you been to the ER, urgent care clinic since your last visit? Hospitalized since your last visit? No    2. Have you seen or consulted any other health care providers outside of the [address] [place] [address] since your last visit? Include any pap smears or colon screening.  No unknown

## 2020-11-01 NOTE — ED ADULT TRIAGE NOTE - CHIEF COMPLAINT QUOTE
pt transferred from [facility] for difficulty ambulating s/p fall, sent for admit, no fx found

## 2020-11-01 NOTE — H&P ADULT - PROBLEM SELECTOR PLAN 7
Lovenox 40mg qhs    IMPROVE VTE Individual Risk Assessment        RISK                                                          Points  [  ] Previous VTE                                                3  [  ] Thrombophilia                                             2  [  ] Lower limb paralysis                                   2        (unable to hold up >15 seconds)    [  ] Current Cancer                                             2         (within 6 months)  [  ] Immobilization > 24 hrs                              1  [  ] ICU/CCU stay > 24 hours                             1  [ x ] Age > 60                                                         1    IMPROVE VTE Score:         [    1     ]

## 2020-11-02 ENCOUNTER — TRANSCRIPTION ENCOUNTER (OUTPATIENT)
Age: 60
End: 2020-11-02

## 2020-11-02 DIAGNOSIS — R33.9 RETENTION OF URINE, UNSPECIFIED: ICD-10-CM

## 2020-11-02 LAB
ANION GAP SERPL CALC-SCNC: 6 MMOL/L — SIGNIFICANT CHANGE UP (ref 5–17)
BASOPHILS # BLD AUTO: 0.03 K/UL — SIGNIFICANT CHANGE UP (ref 0–0.2)
BASOPHILS NFR BLD AUTO: 0.6 % — SIGNIFICANT CHANGE UP (ref 0–2)
BUN SERPL-MCNC: 24 MG/DL — HIGH (ref 7–23)
CALCIUM SERPL-MCNC: 8.4 MG/DL — LOW (ref 8.5–10.1)
CHLORIDE SERPL-SCNC: 107 MMOL/L — SIGNIFICANT CHANGE UP (ref 96–108)
CO2 SERPL-SCNC: 28 MMOL/L — SIGNIFICANT CHANGE UP (ref 22–31)
CREAT SERPL-MCNC: 0.95 MG/DL — SIGNIFICANT CHANGE UP (ref 0.5–1.3)
EOSINOPHIL # BLD AUTO: 0.49 K/UL — SIGNIFICANT CHANGE UP (ref 0–0.5)
EOSINOPHIL NFR BLD AUTO: 9.9 % — HIGH (ref 0–6)
GLUCOSE SERPL-MCNC: 100 MG/DL — HIGH (ref 70–99)
HCT VFR BLD CALC: 38.8 % — LOW (ref 39–50)
HGB BLD-MCNC: 12.9 G/DL — LOW (ref 13–17)
IMM GRANULOCYTES NFR BLD AUTO: 0.2 % — SIGNIFICANT CHANGE UP (ref 0–1.5)
LYMPHOCYTES # BLD AUTO: 1.49 K/UL — SIGNIFICANT CHANGE UP (ref 1–3.3)
LYMPHOCYTES # BLD AUTO: 30 % — SIGNIFICANT CHANGE UP (ref 13–44)
MCHC RBC-ENTMCNC: 30.1 PG — SIGNIFICANT CHANGE UP (ref 27–34)
MCHC RBC-ENTMCNC: 33.2 GM/DL — SIGNIFICANT CHANGE UP (ref 32–36)
MCV RBC AUTO: 90.7 FL — SIGNIFICANT CHANGE UP (ref 80–100)
MONOCYTES # BLD AUTO: 0.8 K/UL — SIGNIFICANT CHANGE UP (ref 0–0.9)
MONOCYTES NFR BLD AUTO: 16.1 % — HIGH (ref 2–14)
NEUTROPHILS # BLD AUTO: 2.14 K/UL — SIGNIFICANT CHANGE UP (ref 1.8–7.4)
NEUTROPHILS NFR BLD AUTO: 43.2 % — SIGNIFICANT CHANGE UP (ref 43–77)
NRBC # BLD: 0 /100 WBCS — SIGNIFICANT CHANGE UP (ref 0–0)
PLATELET # BLD AUTO: 174 K/UL — SIGNIFICANT CHANGE UP (ref 150–400)
POTASSIUM SERPL-MCNC: 3.9 MMOL/L — SIGNIFICANT CHANGE UP (ref 3.5–5.3)
POTASSIUM SERPL-SCNC: 3.9 MMOL/L — SIGNIFICANT CHANGE UP (ref 3.5–5.3)
RBC # BLD: 4.28 M/UL — SIGNIFICANT CHANGE UP (ref 4.2–5.8)
RBC # FLD: 12.7 % — SIGNIFICANT CHANGE UP (ref 10.3–14.5)
SODIUM SERPL-SCNC: 141 MMOL/L — SIGNIFICANT CHANGE UP (ref 135–145)
WBC # BLD: 4.96 K/UL — SIGNIFICANT CHANGE UP (ref 3.8–10.5)
WBC # FLD AUTO: 4.96 K/UL — SIGNIFICANT CHANGE UP (ref 3.8–10.5)

## 2020-11-02 RX ORDER — METOPROLOL TARTRATE 50 MG
50 TABLET ORAL
Refills: 0 | Status: DISCONTINUED | OUTPATIENT
Start: 2020-11-02 | End: 2020-11-04

## 2020-11-02 RX ORDER — TAMSULOSIN HYDROCHLORIDE 0.4 MG/1
0.4 CAPSULE ORAL AT BEDTIME
Refills: 0 | Status: DISCONTINUED | OUTPATIENT
Start: 2020-11-02 | End: 2020-11-04

## 2020-11-02 RX ADMIN — OXYCODONE HYDROCHLORIDE 10 MILLIGRAM(S): 5 TABLET ORAL at 18:28

## 2020-11-02 RX ADMIN — OXYCODONE HYDROCHLORIDE 10 MILLIGRAM(S): 5 TABLET ORAL at 01:10

## 2020-11-02 RX ADMIN — OXYCODONE HYDROCHLORIDE 10 MILLIGRAM(S): 5 TABLET ORAL at 23:51

## 2020-11-02 RX ADMIN — GABAPENTIN 400 MILLIGRAM(S): 400 CAPSULE ORAL at 05:28

## 2020-11-02 RX ADMIN — OXYCODONE HYDROCHLORIDE 10 MILLIGRAM(S): 5 TABLET ORAL at 00:16

## 2020-11-02 RX ADMIN — TAMSULOSIN HYDROCHLORIDE 0.4 MILLIGRAM(S): 0.4 CAPSULE ORAL at 21:12

## 2020-11-02 RX ADMIN — GABAPENTIN 400 MILLIGRAM(S): 400 CAPSULE ORAL at 21:12

## 2020-11-02 RX ADMIN — ENOXAPARIN SODIUM 40 MILLIGRAM(S): 100 INJECTION SUBCUTANEOUS at 21:12

## 2020-11-02 RX ADMIN — OXYCODONE HYDROCHLORIDE 10 MILLIGRAM(S): 5 TABLET ORAL at 17:43

## 2020-11-02 RX ADMIN — GABAPENTIN 400 MILLIGRAM(S): 400 CAPSULE ORAL at 13:20

## 2020-11-02 RX ADMIN — OXYCODONE HYDROCHLORIDE 10 MILLIGRAM(S): 5 TABLET ORAL at 13:12

## 2020-11-02 RX ADMIN — OXYCODONE HYDROCHLORIDE 10 MILLIGRAM(S): 5 TABLET ORAL at 11:03

## 2020-11-02 NOTE — DISCHARGE NOTE PROVIDER - PROVIDER TOKENS
PROVIDER:[TOKEN:[[id number]]] PROVIDER:[TOKEN:[[id number]]],PROVIDER:[TOKEN:[[id number]]]

## 2020-11-02 NOTE — DISCHARGE NOTE PROVIDER - CARE PROVIDER_API CALL
Sergio Parada  CRITICAL CARE MEDICINE  [address] [place] [address], [address]  [place] [address]  Phone: [phone]  Fax: [phone]  Follow Up Time:    Sergio Parada  CRITICAL CARE MEDICINE  [address] [facility], [address]  [place] [address]  Phone: [phone]  Fax: [phone]  Follow Up Time:     Perlman, Craig D  [address], [address]  [place] [address]  Phone: [phone]  Fax: [phone]  Follow Up Time:

## 2020-11-02 NOTE — PHYSICAL THERAPY INITIAL EVALUATION ADULT - PERTINENT HX OF CURRENT PROBLEM, REHAB EVAL
58 yo male with PMH of HTN, hyperthyroidism, Anxiety, chronic back pain, fibromyalgia, GERD, HA, migraines, cervical radiculopathy presents to the ED transferred from [facility] s/p mechanical fall on left hip with severe left groin pain.

## 2020-11-02 NOTE — DISCHARGE NOTE PROVIDER - CARE PROVIDERS DIRECT ADDRESSES
[email or web address]

## 2020-11-02 NOTE — DISCHARGE NOTE PROVIDER - NSDCMRMEDTOKEN_GEN_ALL_CORE_FT
cyclobenzaprine 10 mg oral tablet: 1 tab(s) orally 3 times a day, As Needed    *Pt states taking very infrequently  gabapentin 400 mg oral capsule: 1 cap(s) orally 3 times a day  metoprolol succinate 100 mg oral tablet, extended release: 1 tab(s) orally 2 times a day    Confirmed with pharmacy to be Toprol.  ondansetron 4 mg oral tablet: 1 tab(s) orally every 8 hours, As Needed  oxyCODONE 15 mg oral tablet: 1 tab(s) orally every 6 hours, As Needed  tiZANidine 4 mg oral capsule: 2 cap(s) orally once a day (at bedtime)    *As per pt he has stopped taking couple weeks ago.   cyclobenzaprine 10 mg oral tablet: 1 tab(s) orally 3 times a day, As Needed    *Pt states taking very infrequently  gabapentin 400 mg oral capsule: 1 cap(s) orally 3 times a day  metoprolol succinate 100 mg oral tablet, extended release: 1 tab(s) orally 2 times a day    Confirmed with pharmacy to be Toprol.  ondansetron 4 mg oral tablet: 1 tab(s) orally every 8 hours, As Needed  oxyCODONE 15 mg oral tablet: 1 tab(s) orally every 6 hours, As Needed  rolling walker: ICD R26.81   JULIO C 99   Ht 5&#x27;4 Wt 115 lbs   tiZANidine 4 mg oral capsule: 2 cap(s) orally once a day (at bedtime)    *As per pt he has stopped taking couple weeks ago.   cyclobenzaprine 10 mg oral tablet: 1 tab(s) orally 3 times a day, As Needed    *Pt states taking very infrequently  gabapentin 400 mg oral capsule: 1 cap(s) orally 3 times a day  metoprolol succinate 100 mg oral tablet, extended release: 1 tab(s) orally 2 times a day    Confirmed with pharmacy to be Toprol.  ondansetron 4 mg oral tablet: 1 tab(s) orally every 8 hours, As Needed  oxyCODONE 15 mg oral tablet: 1 tab(s) orally every 6 hours, As Needed  rolling walker: ICD R26.81   JULIO C 99   Ht 5&#x27;4 Wt 115 lbs   tamsulosin 0.4 mg oral capsule: 1 cap(s) orally once a day (at bedtime)  tiZANidine 4 mg oral capsule: 2 cap(s) orally once a day (at bedtime)    *As per pt he has stopped taking couple weeks ago.

## 2020-11-02 NOTE — DISCHARGE NOTE PROVIDER - HOSPITAL COURSE
FROM ADMISSION H+P:   HPI:  60 yo male with PMH of HTN, hyperthyroidism, Anxiety, chronic back pain, fibromyalgia, GERD, HA, migraines, cervical radiculopathy presents to the ED transferred from [place] s/p mechanical fall on left hip with severe left groin pain. Patient states yesterday night around 9:30 PM he was taking a bath, and when he tried to get out of the bathtub he slipped on some water and fell hitting the front of his left hip on the side of the tub. He felt a pull in his groin after hitting the edge of his tub. He denies any chest pain, palpitations dizziness before the fall and denies LOC. He denies hitting his head. He was able to stand up without putting weight on his left leg. The pain continued to worsen and he called EMS who brought him to [place] ED. Patient underwent X-Ray of chest and pelvis and no fracture was found in the pelvis. Patient continued to have severe pain and CT pelvis was ordered, again not finding fracture. Patient was given pain medications and attempted to walk with PT but had severe pain. Pain is currently a 4/10 on the pain scale and radiates to his lower back and is stabbing in nature, relieved by oxycodone.     In the ED:  Vitals: Temp 98.4F | HR 78 | /76 | RR 16 | SpO2 98% RA  CBC WNL  BMP WNL  CXR: The heart is unremarkable. The lungs are clear. Bones are unremarkable for age.  X-ray pelvis: No acute fracture or dislocation. Joint spaces maintained.  CT pelvis: Osteopenia limits fracture detection. No acute displaced fracture or dislocation. If strong clinical concern persists, nuclear bone scan or MRI may be considered.  EKG: NSR, nonspecific T wave abnormality, QTc 443  S/p oxycodone     (01 Nov 2020 10:15)      ---  HOSPITAL COURSE: Pt admitted for inability to ambulate due to left hip pain s/p fall.      ---  CONSULTANTS:        FROM ADMISSION H+P:   HPI:  60 yo male with PMH of HTN, hyperthyroidism, Anxiety, chronic back pain, fibromyalgia, GERD, HA, migraines, cervical radiculopathy presents to the ED transferred from [place] s/p mechanical fall on left hip with severe left groin pain. Patient states yesterday night around 9:30 PM he was taking a bath, and when he tried to get out of the bathtub he slipped on some water and fell hitting the front of his left hip on the side of the tub. He felt a pull in his groin after hitting the edge of his tub. He denies any chest pain, palpitations dizziness before the fall and denies LOC. He denies hitting his head. He was able to stand up without putting weight on his left leg. The pain continued to worsen and he called EMS who brought him to [place] ED. Patient underwent X-Ray of chest and pelvis and no fracture was found in the pelvis. Patient continued to have severe pain and CT pelvis was ordered, again not finding fracture. Patient was given pain medications and attempted to walk with PT but had severe pain. Pain is currently a 4/10 on the pain scale and radiates to his lower back and is stabbing in nature, relieved by oxycodone.     In the ED:  Vitals: Temp 98.4F | HR 78 | /76 | RR 16 | SpO2 98% RA  CBC WNL  BMP WNL  CXR: The heart is unremarkable. The lungs are clear. Bones are unremarkable for age.  X-ray pelvis: No acute fracture or dislocation. Joint spaces maintained.  CT pelvis: Osteopenia limits fracture detection. No acute displaced fracture or dislocation. If strong clinical concern persists, nuclear bone scan or MRI may be considered.  EKG: NSR, nonspecific T wave abnormality, QTc 443  S/p oxycodone     (01 Nov 2020 10:15)    ---  HOSPITAL COURSE: Pt admitted for inability to ambulate due to left hip pain s/p fall.      ---  CONSULTANTS:        FROM ADMISSION H+P:   HPI:  58 yo male with PMH of HTN, hyperthyroidism, Anxiety, chronic back pain, fibromyalgia, GERD, HA, migraines, cervical radiculopathy presents to the ED transferred from [place] s/p mechanical fall on left hip with severe left groin pain. Patient states yesterday night around 9:30 PM he was taking a bath, and when he tried to get out of the bathtub he slipped on some water and fell hitting the front of his left hip on the side of the tub. He felt a pull in his groin after hitting the edge of his tub. He denies any chest pain, palpitations dizziness before the fall and denies LOC. He denies hitting his head. He was able to stand up without putting weight on his left leg. The pain continued to worsen and he called EMS who brought him to [place] ED. Patient underwent X-Ray of chest and pelvis and no fracture was found in the pelvis. Patient continued to have severe pain and CT pelvis was ordered, again not finding fracture. Patient was given pain medications and attempted to walk with PT but had severe pain. Pain is currently a 4/10 on the pain scale and radiates to his lower back and is stabbing in nature, relieved by oxycodone.     In the ED:  Vitals: Temp 98.4F | HR 78 | /76 | RR 16 | SpO2 98% RA  CBC WNL  BMP WNL  CXR: The heart is unremarkable. The lungs are clear. Bones are unremarkable for age.  X-ray pelvis: No acute fracture or dislocation. Joint spaces maintained.  CT pelvis: Osteopenia limits fracture detection. No acute displaced fracture or dislocation. If strong clinical concern persists, nuclear bone scan or MRI may be considered.  EKG: NSR, nonspecific T wave abnormality, QTc 443  S/p oxycodone     (01 Nov 2020 10:15)    ---  HOSPITAL COURSE: Pt admitted for inability to ambulate due to left hip pain s/p fall. Pt with leg pain likely 2/2 deconditioning. Pt found to have decreased strength in b/l LE and would benefit from home PT.     ---  CONSULTANTS:   Dr. Craig Perlman - endocrinology      FROM ADMISSION H+P:   HPI:  60 yo male with PMH of HTN, hyperthyroidism, Anxiety, chronic back pain, fibromyalgia, GERD, HA, migraines, cervical radiculopathy presents to the ED transferred from [place] s/p mechanical fall on left hip with severe left groin pain. Patient states yesterday night around 9:30 PM he was taking a bath, and when he tried to get out of the bathtub he slipped on some water and fell hitting the front of his left hip on the side of the tub. He felt a pull in his groin after hitting the edge of his tub. He denies any chest pain, palpitations dizziness before the fall and denies LOC. He denies hitting his head. He was able to stand up without putting weight on his left leg. The pain continued to worsen and he called EMS who brought him to [place] ED. Patient underwent X-Ray of chest and pelvis and no fracture was found in the pelvis. Patient continued to have severe pain and CT pelvis was ordered, again not finding fracture. Patient was given pain medications and attempted to walk with PT but had severe pain. Pain is currently a 4/10 on the pain scale and radiates to his lower back and is stabbing in nature, relieved by oxycodone.     In the ED:  Vitals: Temp 98.4F | HR 78 | /76 | RR 16 | SpO2 98% RA  CBC WNL  BMP WNL  CXR: The heart is unremarkable. The lungs are clear. Bones are unremarkable for age.  X-ray pelvis: No acute fracture or dislocation. Joint spaces maintained.  CT pelvis: Osteopenia limits fracture detection. No acute displaced fracture or dislocation. If strong clinical concern persists, nuclear bone scan or MRI may be considered.  EKG: NSR, nonspecific T wave abnormality, QTc 443  S/p oxycodone     (01 Nov 2020 10:15)    ---  HOSPITAL COURSE: Pt admitted for inability to ambulate due to left hip pain s/p fall. Pt with leg pain likely 2/2 deconditioning. Pt found to have decreased strength in b/l LE and would benefit from home PT.  Pt with urinary retention inpatient with [facility] and pt started on flomax.     ---  CONSULTANTS:   Dr. Craig Perlman - endocrinology      FROM ADMISSION H+P:   HPI:  58 yo male with PMH of HTN, hyperthyroidism, Anxiety, chronic back pain, fibromyalgia, GERD, HA, migraines, cervical radiculopathy presents to the ED transferred from [place] s/p mechanical fall on left hip with severe left groin pain. Patient states yesterday night around 9:30 PM he was taking a bath, and when he tried to get out of the bathtub he slipped on some water and fell hitting the front of his left hip on the side of the tub. He felt a pull in his groin after hitting the edge of his tub. He denies any chest pain, palpitations dizziness before the fall and denies LOC. He denies hitting his head. He was able to stand up without putting weight on his left leg. The pain continued to worsen and he called EMS who brought him to [place] ED. Patient underwent X-Ray of chest and pelvis and no fracture was found in the pelvis. Patient continued to have severe pain and CT pelvis was ordered, again not finding fracture. Patient was given pain medications and attempted to walk with PT but had severe pain. Pain is currently a 4/10 on the pain scale and radiates to his lower back and is stabbing in nature, relieved by oxycodone.     In the ED:  Vitals: Temp 98.4F | HR 78 | /76 | RR 16 | SpO2 98% RA  CBC WNL  BMP WNL  CXR: The heart is unremarkable. The lungs are clear. Bones are unremarkable for age.  X-ray pelvis: No acute fracture or dislocation. Joint spaces maintained.  CT pelvis: Osteopenia limits fracture detection. No acute displaced fracture or dislocation. If strong clinical concern persists, nuclear bone scan or MRI may be considered.  EKG: NSR, nonspecific T wave abnormality, QTc 443  S/p oxycodone     (01 Nov 2020 10:15)    ---  HOSPITAL COURSE: Pt admitted for inability to ambulate due to left hip pain s/p fall. Pt with leg pain likely 2/2 deconditioning. Pt found to have decreased strength in b/l LE and would benefit from home PT.  Pt with urinary retention and inpatient with [facility]. Pt started on flomax.  Patient medically optimized for discharge home with close outpatient follow up.      ---  CONSULTANTS:   Dr. Craig Perlman - endocrinology      FROM ADMISSION H+P:   HPI:  60 yo male with PMH of HTN, hyperthyroidism, Anxiety, chronic back pain, fibromyalgia, GERD, HA, migraines, cervical radiculopathy presents to the ED transferred from [place] s/p mechanical fall on left hip with severe left groin pain. Patient states yesterday night around 9:30 PM he was taking a bath, and when he tried to get out of the bathtub he slipped on some water and fell hitting the front of his left hip on the side of the tub. He felt a pull in his groin after hitting the edge of his tub. He denies any chest pain, palpitations dizziness before the fall and denies LOC. He denies hitting his head. He was able to stand up without putting weight on his left leg. The pain continued to worsen and he called EMS who brought him to [place] ED. Patient underwent X-Ray of chest and pelvis and no fracture was found in the pelvis. Patient continued to have severe pain and CT pelvis was ordered, again not finding fracture. Patient was given pain medications and attempted to walk with PT but had severe pain. Pain is currently a 4/10 on the pain scale and radiates to his lower back and is stabbing in nature, relieved by oxycodone.     In the ED:  Vitals: Temp 98.4F | HR 78 | /76 | RR 16 | SpO2 98% RA  CBC WNL  BMP WNL  CXR: The heart is unremarkable. The lungs are clear. Bones are unremarkable for age.  X-ray pelvis: No acute fracture or dislocation. Joint spaces maintained.  CT pelvis: Osteopenia limits fracture detection. No acute displaced fracture or dislocation. If strong clinical concern persists, nuclear bone scan or MRI may be considered.  EKG: NSR, nonspecific T wave abnormality, QTc 443  S/p oxycodone     (01 Nov 2020 10:15)    ---  HOSPITAL COURSE: Pt admitted for inability to ambulate due to left hip pain s/p fall. No fractures or dislocations noted on Xray or CT. Pt with leg pain likely 2/2 deconditioning. Pt found to have decreased strength in b/l LE and would benefit from home PT.  Pt with urinary retention and inpatient with [facility]. Pt started on flomax.  Patient medically optimized for discharge home with close outpatient follow up.      ---  CONSULTANTS:   Dr. Craig Perlman - endocrinology      FROM ADMISSION H+P:   HPI:  60 yo male with PMH of HTN, hyperthyroidism, Anxiety, chronic back pain, fibromyalgia, GERD, HA, migraines, cervical radiculopathy presents to the ED transferred from [place] s/p mechanical fall on left hip with severe left groin pain. Patient states yesterday night around 9:30 PM he was taking a bath, and when he tried to get out of the bathtub he slipped on some water and fell hitting the front of his left hip on the side of the tub. He felt a pull in his groin after hitting the edge of his tub. He denies any chest pain, palpitations dizziness before the fall and denies LOC. He denies hitting his head. He was able to stand up without putting weight on his left leg. The pain continued to worsen and he called EMS who brought him to [place] ED. Patient underwent X-Ray of chest and pelvis and no fracture was found in the pelvis. Patient continued to have severe pain and CT pelvis was ordered, again not finding fracture. Patient was given pain medications and attempted to walk with PT but had severe pain. Pain is currently a 4/10 on the pain scale and radiates to his lower back and is stabbing in nature, relieved by oxycodone.     In the ED:  Vitals: Temp 98.4F | HR 78 | /76 | RR 16 | SpO2 98% RA  CBC WNL  BMP WNL  CXR: The heart is unremarkable. The lungs are clear. Bones are unremarkable for age.  X-ray pelvis: No acute fracture or dislocation. Joint spaces maintained.  CT pelvis: Osteopenia limits fracture detection. No acute displaced fracture or dislocation. If strong clinical concern persists, nuclear bone scan or MRI may be considered.  EKG: NSR, nonspecific T wave abnormality, QTc 443  S/p oxycodone     (01 Nov 2020 10:15)    ---  HOSPITAL COURSE: Pt admitted for inability to ambulate due to left hip pain s/p fall. No fractures or dislocations noted on Xray or CT. Pt with leg pain likely 2/2 deconditioning. Pt found to have decreased strength in b/l LE and would benefit from home PT.  Pt with urinary retention and inpatient with [facility]. Pt started on flomax. Patient with history of a cold thyroid nodule, seen by endocrinology during hospitalization and should follow up outpatient with endocrinology.   Patient medically optimized for discharge home with close outpatient follow up.      ---  CONSULTANTS:   Dr. Craig Perlman - endocrinology      FROM ADMISSION H+P:   HPI:  58 yo male with PMH of HTN, hyperthyroidism, Anxiety, chronic back pain, fibromyalgia, GERD, HA, migraines, cervical radiculopathy presents to the ED transferred from [place] s/p mechanical fall on left hip with severe left groin pain. Patient states yesterday night around 9:30 PM he was taking a bath, and when he tried to get out of the bathtub he slipped on some water and fell hitting the front of his left hip on the side of the tub. He felt a pull in his groin after hitting the edge of his tub. He denies any chest pain, palpitations dizziness before the fall and denies LOC. He denies hitting his head. He was able to stand up without putting weight on his left leg. The pain continued to worsen and he called EMS who brought him to [place] ED. Patient underwent X-Ray of chest and pelvis and no fracture was found in the pelvis. Patient continued to have severe pain and CT pelvis was ordered, again not finding fracture. Patient was given pain medications and attempted to walk with PT but had severe pain. Pain is currently a 4/10 on the pain scale and radiates to his lower back and is stabbing in nature, relieved by oxycodone.     In the ED:  Vitals: Temp 98.4F | HR 78 | /76 | RR 16 | SpO2 98% RA  CBC WNL  BMP WNL  CXR: The heart is unremarkable. The lungs are clear. Bones are unremarkable for age.  X-ray pelvis: No acute fracture or dislocation. Joint spaces maintained.  CT pelvis: Osteopenia limits fracture detection. No acute displaced fracture or dislocation. If strong clinical concern persists, nuclear bone scan or MRI may be considered.  EKG: NSR, nonspecific T wave abnormality, QTc 443  S/p oxycodone     (01 Nov 2020 10:15)    ---  HOSPITAL COURSE: Pt admitted for inability to ambulate due to left hip pain s/p fall. No fractures or dislocations noted on Xray or CT. Pt with leg pain likely 2/2 deconditioning. Pt found to have decreased strength in b/l LE, PT evlauated patient and recommend home with home PT and use of rolling walker  Pt also had urinary retention and inpatient with [facility]. Pt started on flomax. Patient with history of a cold thyroid nodule, seen by endocrinology during hospitalization and should follow up outpatient with endocrinology.   Patient medically optimized for discharge home with close outpatient follow up.      ---  CONSULTANTS:   Dr. Craig Perlman - endocrinology      FROM ADMISSION H+P:   HPI:  60 yo male with PMH of HTN, hyperthyroidism, Anxiety, chronic back pain, fibromyalgia, GERD, HA, migraines, cervical radiculopathy presents to the ED transferred from [place] s/p mechanical fall on left hip with severe left groin pain. Patient states yesterday night around 9:30 PM he was taking a bath, and when he tried to get out of the bathtub he slipped on some water and fell hitting the front of his left hip on the side of the tub. He felt a pull in his groin after hitting the edge of his tub. He denies any chest pain, palpitations dizziness before the fall and denies LOC. He denies hitting his head. He was able to stand up without putting weight on his left leg. The pain continued to worsen and he called EMS who brought him to [place] ED. Patient underwent X-Ray of chest and pelvis and no fracture was found in the pelvis. Patient continued to have severe pain and CT pelvis was ordered, again not finding fracture. Patient was given pain medications and attempted to walk with PT but had severe pain. Pain is currently a 4/10 on the pain scale and radiates to his lower back and is stabbing in nature, relieved by oxycodone.     In the ED:  Vitals: Temp 98.4F | HR 78 | /76 | RR 16 | SpO2 98% RA  CBC WNL  BMP WNL  CXR: The heart is unremarkable. The lungs are clear. Bones are unremarkable for age.  X-ray pelvis: No acute fracture or dislocation. Joint spaces maintained.  CT pelvis: Osteopenia limits fracture detection. No acute displaced fracture or dislocation. If strong clinical concern persists, nuclear bone scan or MRI may be considered.  EKG: NSR, nonspecific T wave abnormality, QTc 443  S/p oxycodone     (01 Nov 2020 10:15)    ---  HOSPITAL COURSE: Pt admitted for inability to ambulate due to left hip pain s/p fall. No fractures or dislocations noted on Xray or CT. Pt with leg pain likely 2/2 deconditioning. Pt found to have decreased strength in b/l LE, PT evlauated patient and recommend home with home PT and use of rolling walker  Pt also had urinary retention and inpatient with [facility], trial of void was done which patient passed. Pt  was discharged on Folmax. Patient with history of a thyroid nodule, seen by endocrinology during hospitalization and should follow up outpatient with endocrinology for further imaging.    Patient medically optimized for discharge home with close outpatient follow up.      ---  CONSULTANTS:   Dr. Craig Perlman - endocrinology

## 2020-11-02 NOTE — DISCHARGE NOTE PROVIDER - NSDCCPCAREPLAN_GEN_ALL_CORE_FT
PRINCIPAL DISCHARGE DIAGNOSIS  Diagnosis: Hip pain  Assessment and Plan of Treatment:       SECONDARY DISCHARGE DIAGNOSES  Diagnosis: GERD (gastroesophageal reflux disease)  Assessment and Plan of Treatment: Continue to take zofran as needed for nausea    Diagnosis: Fibromyalgia  Assessment and Plan of Treatment: Continue to take gabapentin 400mg three times a day and Cyclobenzaprine 10mg three times a day    Diagnosis: HTN (hypertension)  Assessment and Plan of Treatment: Continue your metoprolol 100mg twice a day     PRINCIPAL DISCHARGE DIAGNOSIS  Diagnosis: Hip pain  Assessment and Plan of Treatment: You fell and hurt your hip. You were unable to walk when you came into the hospital. You worked with physical therapy and it was found that you had decreased strength in your legs and you will benefit from physical therapy.  - Use your rolling walker to walk  - Continue working with physical therapy to regain strength and balance  - Follow up with your primary care physician in 1 to 2 week.s      SECONDARY DISCHARGE DIAGNOSES  Diagnosis: GERD (gastroesophageal reflux disease)  Assessment and Plan of Treatment: Continue to take zofran as needed for nausea    Diagnosis: Fibromyalgia  Assessment and Plan of Treatment: Continue to take gabapentin 400mg three times a day and Cyclobenzaprine 10mg three times a day    Diagnosis: HTN (hypertension)  Assessment and Plan of Treatment: Continue your metoprolol 100mg twice a day     PRINCIPAL DISCHARGE DIAGNOSIS  Diagnosis: Hip pain  Assessment and Plan of Treatment: You fell and hurt your hip. You were unable to walk when you came into the hospital. You worked with physical therapy and it was found that you had decreased strength in your legs and you will benefit from physical therapy.  -Continue home pain medications as needed, you can also get lidocaine patches for your back over the counter as well at your local pharmacy if needed   - Use your rolling walker to walk  - Continue working with physical therapy to regain strength and balance  - Follow up with your primary care physician in 1 to 2 week.s      SECONDARY DISCHARGE DIAGNOSES  Diagnosis: Urinary retention  Assessment and Plan of Treatment: -You had difficult urinating, a hackett was placed   -START Flomax, a prescription was sent to your pharmacy   -____________    Diagnosis: Fibromyalgia  Assessment and Plan of Treatment: Continue  home medications gabapentin and Cyclobenzaprine    Diagnosis: GERD (gastroesophageal reflux disease)  Assessment and Plan of Treatment: Continue to take zofran as needed for nausea    Diagnosis: HTN (hypertension)  Assessment and Plan of Treatment: Continue your metoprolol 100mg twice a day     PRINCIPAL DISCHARGE DIAGNOSIS  Diagnosis: Hip pain  Assessment and Plan of Treatment: You fell and hurt your hip. You were unable to walk when you came into the hospital. You worked with physical therapy and it was found that you had decreased strength in your legs and you will benefit from physical therapy.  -Continue home pain medications as needed, you can also get lidocaine patches for your back over the counter as well at your local pharmacy if needed   - Use your rolling walker to walk  - Continue working with physical therapy to regain strength and balance  - Follow up with your primary care physician in 1 to 2 week.s      SECONDARY DISCHARGE DIAGNOSES  Diagnosis: Urinary retention  Assessment and Plan of Treatment: -You had difficult urinating, a hackett was placed   -START Flomax, a prescription was sent to your pharmacy   -___________    Diagnosis: Thyroid nodule  Assessment and Plan of Treatment: -You were seen by am endocrinologist (Craig Perlman) while in the hospital for your thyroid nodule, follow-up outpatient for an ultrasound    Diagnosis: Fibromyalgia  Assessment and Plan of Treatment: Continue  home medications gabapentin and Cyclobenzaprine    Diagnosis: GERD (gastroesophageal reflux disease)  Assessment and Plan of Treatment: Continue to take zofran as needed for nausea    Diagnosis: HTN (hypertension)  Assessment and Plan of Treatment: Continue your metoprolol 100mg twice a day     PRINCIPAL DISCHARGE DIAGNOSIS  Diagnosis: Hip pain  Assessment and Plan of Treatment: You fell and hurt your hip. You were unable to walk when you came into the hospital. You worked with physical therapy and it was found that you had decreased strength in your legs and you will benefit from physical therapy.  -Continue home pain medications as needed, you can also get lidocaine patches for your back over the counter as well at your local pharmacy if needed   - Use your rolling walker to walk  - Continue working with physical therapy to regain strength and balance  - Follow up with your primary care physician in 1 to 2 week.s      SECONDARY DISCHARGE DIAGNOSES  Diagnosis: Urinary retention  Assessment and Plan of Treatment: -You had difficult urinating, a hackett was placed and was removed when you passed your trial of void   -START Flomax, a prescription was sent to your pharmacy   -Follow-up with your primary care doctor within 1 week of discharge    Diagnosis: Thyroid nodule  Assessment and Plan of Treatment: -You were seen by am endocrinologist (Craig Perlman) while in the hospital for your thyroid nodule, follow-up outpatient with Dr. Jf Johnson for an ultrasound of your thryroid    Diagnosis: Fibromyalgia  Assessment and Plan of Treatment: Continue  home medications gabapentin and Cyclobenzaprine    Diagnosis: GERD (gastroesophageal reflux disease)  Assessment and Plan of Treatment: Continue your home medication zofran as needed for nausea    Diagnosis: HTN (hypertension)  Assessment and Plan of Treatment: Continue your metoprolol 100mg twice a day     PRINCIPAL DISCHARGE DIAGNOSIS  Diagnosis: Hip pain  Assessment and Plan of Treatment: You fell and hurt your hip. You were unable to walk when you came into the hospital. You worked with physical therapy and it was found that you had decreased strength in your legs and you will benefit from physical therapy.  -Continue home pain medications as needed, you can also get lidocaine patches for your back over the counter as well at your local pharmacy if needed   - Use your rolling walker to walk  - Continue working with physical therapy to regain strength and balance  - Follow up with your primary care physician in 1 to 2 week.s      SECONDARY DISCHARGE DIAGNOSES  Diagnosis: GERD (gastroesophageal reflux disease)  Assessment and Plan of Treatment: Continue your home medication zofran as needed for nausea    Diagnosis: Fibromyalgia  Assessment and Plan of Treatment: Continue  home medications gabapentin and Cyclobenzaprine    Diagnosis: HTN (hypertension)  Assessment and Plan of Treatment: Continue your metoprolol 100mg twice a day    Diagnosis: Thyroid nodule  Assessment and Plan of Treatment: -You were seen by am endocrinologist (Craig Perlman) while in the hospital for your thyroid nodule, follow-up outpatient with Dr. Craig Perlman for an ultrasound of your thryroid    Diagnosis: Urinary retention  Assessment and Plan of Treatment: -You had difficult urinating, a hackett was placed and was removed when you passed your trial of void   -START Flomax, a prescription was sent to your pharmacy   -Follow-up with your primary care doctor within 1 week of discharge

## 2020-11-02 NOTE — PHYSICAL THERAPY INITIAL EVALUATION ADULT - TRANSFER TRAINING, PT EVAL
Patient will transfer from all surfaces independently in 2 weeks in order to increase safety at home

## 2020-11-02 NOTE — PROGRESS NOTE ADULT - SUBJECTIVE AND OBJECTIVE BOX
Patient is a 59y old  Male who presents with a chief complaint of Hip pain, inability to ambulate (01 Nov 2020 10:15)    INTERVAL HPI/OVERNIGHT EVENTS: Pt seen and examined at bedside. He states he still has some pain in his left groin region. Pt has hackett in since he was found to have >900cc on bladder scan and was unable to urinate. He has not had a BM since Friday.     T(C): 36.3 (11-02-20 @ 04:35), Max: 37 (11-01-20 @ 11:15)  HR: 79 (11-02-20 @ 04:35) (75 - 87)  BP: 102/67 (11-02-20 @ 04:35) (102/67 - 128/80)  RR: 18 (11-02-20 @ 04:35) (16 - 18)  SpO2: 94% (11-02-20 @ 04:35) (93% - 99%)  Wt(kg): --    I&O's Summary    01 Nov 2020 07:01  -  02 Nov 2020 07:00  --------------------------------------------------------  IN: 60 mL / OUT: 1055 mL / NET: -995 mL        LABS:                        12.9   4.96  )-----------( 174      ( 02 Nov 2020 05:42 )             38.8     11-02    141  |  107  |  24<H>  ----------------------------<  100<H>  3.9   |  28  |  0.95    Ca    8.4<L>      02 Nov 2020 05:42        CAPILLARY BLOOD GLUCOSE                 MEDICATIONS  (STANDING):  enoxaparin Injectable 40 milliGRAM(s) SubCutaneous at bedtime  gabapentin 400 milliGRAM(s) Oral three times a day  metoprolol succinate  milliGRAM(s) Oral two times a day  oxyCODONE    IR 10 milliGRAM(s) Oral every 6 hours    MEDICATIONS  (PRN):  cyclobenzaprine 10 milliGRAM(s) Oral three times a day PRN Muscle Spasm  ondansetron    Tablet 4 milliGRAM(s) Oral every 8 hours PRN Nausea and/or Vomiting      REVIEW OF SYSTEMS:  CONSTITUTIONAL: No fever or fatigue  EYES: No eye pain, visual disturbances  ENMT: No difficulty hearing; No sinus or throat pain  NECK: No pain or stiffness  RESPIRATORY: No cough or chills; No shortness of breath  CARDIOVASCULAR: No chest pain, palpitations, dizziness, or leg swelling  GASTROINTESTINAL: No abdominal or epigastric pain. No nausea, vomiting, or hematemesis; No BM since Friday.  GENITOURINARY: Pt with hackett, unable to urinate yesterday  NEUROLOGICAL: No headaches, memory loss  MUSCULOSKELETAL: +L groin pain, No joint pain or swelling; No muscle or extremity pain  PSYCHIATRIC: No difficulty sleeping    PHYSICAL EXAM:  GENERAL: NAD, well-groomed, well-developed  HEAD: Atraumatic, Normocephalic  EYES: EOMI, PERRL, conjunctiva and sclera clear  ENMT: Moist mucous membranes  NERVOUS SYSTEM: Alert & Oriented X3, Good concentration;  CHEST/LUNG: Clear to ascultation bilaterally; No rales, rhonchi, wheezing, or rubs  HEART: Regular rate and rhythm; No murmurs, rubs, or gallops  ABDOMEN: Soft, Nontender, Nondistended; Bowel sounds present  GENITOURINARY: Tender to palpation of left groin region, no hernia appreciated.   EXTREMITIES: 2+ Peripheral Pulses, No clubbing, cyanosis, or edema  SKIN: Warm, dry, well-perfused      RADIOLOGY & ADDITIONAL TESTS:    Imaging Personally Reviewed:  [ ] YES  [ ] NO    Consultant(s) Notes Reviewed:  [ ] YES  [ ] NO    Care Discussed with Consultants/Other Providers [ ] YES  [ ] NO

## 2020-11-02 NOTE — PROGRESS NOTE ADULT - ASSESSMENT
58 yo male with PMH of HTN, HLD, hyperthyroidism, Anxiety, chronic back pain, fibromyalgia, GERD, HA, migraines, cervical radiculopathy presents to the ED transferred from [place] s/p mechanical fall on left hip admitted with severe left groin pain and inability to ambulate.

## 2020-11-02 NOTE — PROGRESS NOTE ADULT - PROBLEM SELECTOR PLAN 4
Chronic  - Patient takes metoprolol 100mg BID due to palpitations from hyperthyroidism  - Denies taking any thyroid medications

## 2020-11-02 NOTE — PHYSICAL THERAPY INITIAL EVALUATION ADULT - ADDITIONAL COMMENTS
Patient lives alone in private home + [place] and has 1 flight to bedroom.  Patient can stay on first floor when he returns home.  Patient has tub shower

## 2020-11-02 NOTE — PHYSICAL THERAPY INITIAL EVALUATION ADULT - BED MOBILITY TRAINING, PT EVAL
Patient will perform all bed mobility independently in 2 weeks in order to increase mobility ar home

## 2020-11-02 NOTE — PROGRESS NOTE ADULT - PROBLEM SELECTOR PLAN 1
Patient presenting with L groin/hip pain s/p mechanical fall after slipping on water while getting out of tub  - Xray: negative for acute fracture, CT pelvis: osteopenia, no fracture noted  - Patient with severe pain on ambulation, has not been bearing weight on left side  - PT consulted, f/u recs  - Pain regimen: takes oxycodone 15mg q6 hours, will continue standing Patient presenting with L groin/hip pain s/p mechanical fall after slipping on water while getting out of tub  - Xray: negative for acute fracture, CT pelvis: osteopenia, no fracture noted  - Patient with severe pain on ambulation, has not been bearing weight on left side  - PT consulted, f/u recs  - Pain regimen: oxycodone 10mg q6 hours, will continue standing

## 2020-11-02 NOTE — PHYSICAL THERAPY INITIAL EVALUATION ADULT - GAIT TRAINING, PT EVAL
Patient will ambulate x 100 feet with RW independently in 2 weeks in order to increase mobility at home

## 2020-11-03 ENCOUNTER — TRANSCRIPTION ENCOUNTER (OUTPATIENT)
Age: 60
End: 2020-11-03

## 2020-11-03 DIAGNOSIS — E04.1 NONTOXIC SINGLE THYROID NODULE: ICD-10-CM

## 2020-11-03 LAB
ANION GAP SERPL CALC-SCNC: 3 MMOL/L — LOW (ref 5–17)
BUN SERPL-MCNC: 16 MG/DL — SIGNIFICANT CHANGE UP (ref 7–23)
CALCIUM SERPL-MCNC: 8.5 MG/DL — SIGNIFICANT CHANGE UP (ref 8.5–10.1)
CHLORIDE SERPL-SCNC: 109 MMOL/L — HIGH (ref 96–108)
CO2 SERPL-SCNC: 30 MMOL/L — SIGNIFICANT CHANGE UP (ref 22–31)
CREAT SERPL-MCNC: 0.75 MG/DL — SIGNIFICANT CHANGE UP (ref 0.5–1.3)
GLUCOSE SERPL-MCNC: 102 MG/DL — HIGH (ref 70–99)
HCT VFR BLD CALC: 39.4 % — SIGNIFICANT CHANGE UP (ref 39–50)
HGB BLD-MCNC: 12.9 G/DL — LOW (ref 13–17)
MCHC RBC-ENTMCNC: 30.3 PG — SIGNIFICANT CHANGE UP (ref 27–34)
MCHC RBC-ENTMCNC: 32.7 GM/DL — SIGNIFICANT CHANGE UP (ref 32–36)
MCV RBC AUTO: 92.5 FL — SIGNIFICANT CHANGE UP (ref 80–100)
NRBC # BLD: 0 /100 WBCS — SIGNIFICANT CHANGE UP (ref 0–0)
PLATELET # BLD AUTO: 176 K/UL — SIGNIFICANT CHANGE UP (ref 150–400)
POTASSIUM SERPL-MCNC: 4 MMOL/L — SIGNIFICANT CHANGE UP (ref 3.5–5.3)
POTASSIUM SERPL-SCNC: 4 MMOL/L — SIGNIFICANT CHANGE UP (ref 3.5–5.3)
RBC # BLD: 4.26 M/UL — SIGNIFICANT CHANGE UP (ref 4.2–5.8)
RBC # FLD: 12.3 % — SIGNIFICANT CHANGE UP (ref 10.3–14.5)
SODIUM SERPL-SCNC: 142 MMOL/L — SIGNIFICANT CHANGE UP (ref 135–145)
WBC # BLD: 5.17 K/UL — SIGNIFICANT CHANGE UP (ref 3.8–10.5)
WBC # FLD AUTO: 5.17 K/UL — SIGNIFICANT CHANGE UP (ref 3.8–10.5)

## 2020-11-03 PROCEDURE — 71045 X-RAY EXAM CHEST 1 VIEW: CPT | Mod: 26

## 2020-11-03 PROCEDURE — 93010 ELECTROCARDIOGRAM REPORT: CPT

## 2020-11-03 RX ORDER — TAMSULOSIN HYDROCHLORIDE 0.4 MG/1
1 CAPSULE ORAL
Qty: 14 | Refills: 0
Start: 2020-11-03 | End: 2020-11-16

## 2020-11-03 RX ORDER — ACETAMINOPHEN 500 MG
650 TABLET ORAL EVERY 6 HOURS
Refills: 0 | Status: DISCONTINUED | OUTPATIENT
Start: 2020-11-03 | End: 2020-11-04

## 2020-11-03 RX ORDER — LIDOCAINE 4 G/100G
2 CREAM TOPICAL DAILY
Refills: 0 | Status: DISCONTINUED | OUTPATIENT
Start: 2020-11-03 | End: 2020-11-04

## 2020-11-03 RX ORDER — IBUPROFEN 200 MG
400 TABLET ORAL ONCE
Refills: 0 | Status: COMPLETED | OUTPATIENT
Start: 2020-11-03 | End: 2020-11-03

## 2020-11-03 RX ADMIN — LIDOCAINE 2 PATCH: 4 CREAM TOPICAL at 19:14

## 2020-11-03 RX ADMIN — OXYCODONE HYDROCHLORIDE 10 MILLIGRAM(S): 5 TABLET ORAL at 17:25

## 2020-11-03 RX ADMIN — OXYCODONE HYDROCHLORIDE 10 MILLIGRAM(S): 5 TABLET ORAL at 06:16

## 2020-11-03 RX ADMIN — Medication 400 MILLIGRAM(S): at 16:23

## 2020-11-03 RX ADMIN — TAMSULOSIN HYDROCHLORIDE 0.4 MILLIGRAM(S): 0.4 CAPSULE ORAL at 21:12

## 2020-11-03 RX ADMIN — Medication 650 MILLIGRAM(S): at 13:39

## 2020-11-03 RX ADMIN — GABAPENTIN 400 MILLIGRAM(S): 400 CAPSULE ORAL at 21:12

## 2020-11-03 RX ADMIN — Medication 650 MILLIGRAM(S): at 12:39

## 2020-11-03 RX ADMIN — OXYCODONE HYDROCHLORIDE 10 MILLIGRAM(S): 5 TABLET ORAL at 12:06

## 2020-11-03 RX ADMIN — OXYCODONE HYDROCHLORIDE 10 MILLIGRAM(S): 5 TABLET ORAL at 05:16

## 2020-11-03 RX ADMIN — ENOXAPARIN SODIUM 40 MILLIGRAM(S): 100 INJECTION SUBCUTANEOUS at 21:12

## 2020-11-03 RX ADMIN — OXYCODONE HYDROCHLORIDE 10 MILLIGRAM(S): 5 TABLET ORAL at 11:09

## 2020-11-03 RX ADMIN — Medication 50 MILLIGRAM(S): at 17:25

## 2020-11-03 RX ADMIN — LIDOCAINE 2 PATCH: 4 CREAM TOPICAL at 12:36

## 2020-11-03 RX ADMIN — GABAPENTIN 400 MILLIGRAM(S): 400 CAPSULE ORAL at 14:01

## 2020-11-03 RX ADMIN — GABAPENTIN 400 MILLIGRAM(S): 400 CAPSULE ORAL at 05:16

## 2020-11-03 RX ADMIN — OXYCODONE HYDROCHLORIDE 10 MILLIGRAM(S): 5 TABLET ORAL at 00:45

## 2020-11-03 RX ADMIN — OXYCODONE HYDROCHLORIDE 10 MILLIGRAM(S): 5 TABLET ORAL at 18:25

## 2020-11-03 RX ADMIN — Medication 400 MILLIGRAM(S): at 15:23

## 2020-11-03 NOTE — PROGRESS NOTE ADULT - PROBLEM SELECTOR PLAN 1
Patient presenting with L groin/hip pain s/p mechanical fall after slipping on water while getting out of tub  - Xray: negative for acute fracture, CT pelvis: osteopenia, no fracture noted  - Pt was able to walk with PT yesterday  - PT rec: home care PT with rolling walker  - Pain regimen: takes oxycodone 15mg q6 hours, will continue standing Patient presenting with L groin/hip pain s/p mechanical fall after slipping on water while getting out of tub  - Xray: negative for acute fracture, CT pelvis: osteopenia, no fracture noted  - Pt was able to walk with PT yesterday  - PT rec: home care PT with rolling walker  - Pain regimen: oxycodone 10mg q6 hours, will continue standing

## 2020-11-03 NOTE — PROGRESS NOTE ADULT - PROBLEM SELECTOR PLAN 5
Chronic  - s/p Nissen fundoplication  - Takes Zofran for nausea, will continue inpatient  - Per patient does not use any OTC acid reducers

## 2020-11-03 NOTE — CONSULT NOTE ADULT - SUBJECTIVE AND OBJECTIVE BOX
HPI: Decision made to obtain and review patient’s current hospital records, which are summarized as follows.   60 yo male with PMH of HTN, HLD, hyperthyroidism, Anxiety, chronic back pain, fibromyalgia, GERD, HA, migraines, cervical radiculopathy presents to the ED transferred from [place] s/p mechanical fall on left hip admitted with severe left groin pain and inability to ambulate.  Patient had an x-ray done of his pelvis which did not show any fracture or dislocation.  Patient states pain is 7/10 currently.      Radiological studies reviewed.  < from: Xray Pelvis AP only (11.01.20 @ 03:01) >  Findings/  Impression: The heart is unremarkable. The lungs are clear. Bones are unremarkable for age.    Pelvis: No acute fracture or dislocation. Joint spaces maintained.    Medical studies/laboratory studies reviewed including CBC and BMP.      Prior Functional Status: Bed mobility independent.  Transfers independent.  Standing balance normal.  Independent with activities of daily living. Community ambulation independent.    Patient lives by himself in a private home.  There are no steps to enter his home.  Once in his house there is a flight of stairs to get to his second floor where his bedroom is located.  He states he is able to sleep on first floor if needed.    Current Functional Status: Patient performs bed mobility and functional transfers with supervision. Patient ambulates x 50 feet with RW with mild antalgic limp.  Patient taken to bathroom and able to perform all activities with  supervision.    Current Medications reviewed.    The patient was seen and examined at bedside.    ROS:  Constitutional: Denies fevers or chills  Eyes: Denies blurry vision or double vision  Ears/Nose/Mouth/Throat: Denies any pain on swallowing or dry mouth or runny nose  CV: Denies chest pain or palpitations  Respiratory: Denies cough or dyspnea  GI: Denies nausea, vomiting, abdominal pain  : Denies urinary frequency or dysuria  MSK: Denies any myalgia or arthralgia  Neuro: Denies any headache or dizziness  Psychiatric: Denies depression or anxiety    PMHx: As above in HPI  Social Hx: No history of alcohol, tobacco, or illicit drug use.  Family History: Family history reviewed and found to be non pertinent to patients current disposition.    Physical Exam:     Constitutional: Gen: In no acute distress, cooperative with exam and questioning   Eyes: PERRL, no erythema of conjunctivae  Neck: No midline tenderness to palpation, supple  Ears/Nose/Mouth/Throat: Mucous membranes moist, no thrush, no rhinorrhea  CV: Regular rate and rhythm, S1 S2, no peripheral edema, pedal pulses intact  Resp: Good respiratory effort, Good air movement all lung fields  GI: Nontender, normoactive bowel sounds  Neuro: Cranial Nerves II-XII intact, sensation intact to light touch  Skin: No rashes, normal temperature on palpation  Psychiatric: Awake alert fully oriented    MSK: Cervical Spine Exam:  Inspection:  no erythema, no edema noted  Power:  Motor exam 5/5 grossly in upper extremities bilaterally  Range of Motion:  decreased range of motion due to pain   Palpation:  muscle spasm and tenderness to palpation across the cervical spine including in Trapezius,SCM, and cervical paraspinals bilaterally  Reflexes:  Biceps reflex is normal, Brachioradialis reflex is normal, triceps reflex is normal Sensation:  sensation is intact bilaterally  Special Tests:  Spurling's test is negative bilaterally, Llhermite’s sign is negative, Negative facet loading.  An sign is negative       Lumbar Spine Exam:  Inspection:  no erythema, no edema   Palpation:  SIJ tenderness is negative, there is muscle spasm and tenderness to palpation across lumbar paraspinals bilaterally  Range of Motion:  decreased range of motion of lumbar spine secondary to pain   Power:  motor exam 5/5 throughout LE  Sensation:  sensation is diminished to L5/S1  Reflexes:  DTR’s= symmetric in LE  Special Tests:  Straight leg raising test is negative bilaterally ,Luis/RAMSEY maneuver is negative, Negative Facet loading, Clonus negative      HPI: Decision made to obtain and review patient’s current hospital records, which are summarized as follows.   58 yo male with PMH of HTN, HLD, hyperthyroidism, Anxiety, chronic back pain, fibromyalgia, GERD, HA, migraines, cervical radiculopathy presents to the ED transferred from [place] s/p mechanical fall on left hip admitted with severe left groin pain and inability to ambulate.  Patient had an x-ray done of his pelvis which did not show any fracture or dislocation.  Patient states pain is 7/10 currently.  Patient follows with outpatient [facility] management at [facility] with Dr. Lu for chronic neck pain due to disc herniations.  He states he takes oxycodone 15 mg q 6 hours PRN, flexeril 10 mg TID PRN, and gabapentin 400 mg TID. Patient admits to numbness/tingling in upper extremities.  He states that when he recently fell- he fell on his left side.  Pain has since been exacerbated.  Currently in the hospital, he is on 10 mg q 6 hours of oxycodone.  He states his current pain regimen is not enough for him.  Admits to constipation.  Denies bowel/bladder incontinence.      Radiological studies reviewed.  < from: Xray Pelvis AP only (11.01.20 @ 03:01) >  Findings/  Impression: The heart is unremarkable. The lungs are clear. Bones are unremarkable for age.    Pelvis: No acute fracture or dislocation. Joint spaces maintained.    Medical studies/laboratory studies reviewed including CBC and BMP.      Prior Functional Status: Bed mobility independent.  Transfers independent.  Standing balance normal.  Independent with activities of daily living. Community ambulation independent.    Patient lives by himself in a private home.  There are no steps to enter his home.  Once in his house there is a flight of stairs to get to his second floor where his bedroom is located.  He states he is able to sleep on first floor if needed.    Current Functional Status: Patient performs bed mobility and functional transfers with supervision. Patient ambulates x 50 feet with RW with mild antalgic limp.  Patient taken to bathroom and able to perform all activities with  supervision.    Current Medications reviewed.  MEDICATIONS  (STANDING):  enoxaparin Injectable 40 milliGRAM(s) SubCutaneous at bedtime  gabapentin 400 milliGRAM(s) Oral three times a day  lidocaine   Patch 2 Patch Transdermal daily  metoprolol tartrate 50 milliGRAM(s) Oral two times a day  oxyCODONE    IR 10 milliGRAM(s) Oral every 6 hours  tamsulosin 0.4 milliGRAM(s) Oral at bedtime    MEDICATIONS  (PRN):  acetaminophen   Tablet .. 650 milliGRAM(s) Oral every 6 hours PRN Temp greater or equal to 38C (100.4F), Mild Pain (1 - 3)  cyclobenzaprine 10 milliGRAM(s) Oral three times a day PRN Muscle Spasm  ondansetron    Tablet 4 milliGRAM(s) Oral every 8 hours PRN Nausea and/or Vomiting    The patient was seen and examined at bedside.    ROS:  Constitutional: Denies fevers or chills  Eyes: Denies blurry vision or double vision  Ears/Nose/Mouth/Throat: Denies any pain on swallowing or dry mouth or runny nose  CV: Denies chest pain or palpitations  Respiratory: Denies cough or dyspnea  GI: Denies nausea, vomiting, abdominal pain  : Denies urinary frequency or dysuria  MSK: admits to left hip pain.  admits to back pain.  admits to neck pain.   Neuro: Denies any headache or dizziness  Psychiatric: Denies depression or anxiety    PMHx: As above in HPI  Social Hx: No history of alcohol, tobacco, or illicit drug use.  Family History: Family history reviewed and found to be non pertinent to patients current disposition.    Physical Exam:   Constitutional: Gen: In no acute distress, cooperative with exam and questioning   Eyes: PERRL, no erythema of conjunctivae  Neck: No midline tenderness to palpation, supple  Ears/Nose/Mouth/Throat: Mucous membranes moist, no thrush, no rhinorrhea  CV: Regular rate and rhythm, S1 S2, no peripheral edema, pedal pulses intact  Resp: Good respiratory effort, Good air movement all lung fields  GI: Nontender, normoactive bowel sounds  Neuro: Cranial Nerves II-XII intact, sensation intact to light touch  Skin: No rashes, normal temperature on palpation  Psychiatric: Awake alert fully oriented    MSK: Cervical Spine Exam:  Inspection:  no erythema, no edema noted  Power:  Motor exam 5/5 grossly in upper extremities bilaterally  Range of Motion:  decreased range of motion due to pain   Palpation:  muscle spasm and tenderness to palpation across the cervical spine including in Trapezius, SCM, and cervical paraspinals bilaterally  Reflexes:  Biceps reflex is normal, Brachioradialis reflex is normal, triceps reflex is normal Sensation:  sensation is intact bilaterally  Special Tests:  Spurling's test is equivocal bilaterally, Llhermite’s sign is negative, Positive facet loading.  An sign is negative       Lumbar Spine/Hip Exam:  Inspection:  no erythema, no edema   Palpation:  SIJ tenderness is negative, there is muscle spasm and tenderness to palpation across lumbar paraspinals bilaterally  Range of Motion:  decreased range of motion of lumbar spine secondary to pain   Power:  motor exam 5/5 throughout LE  Sensation:  sensation is diminished to L5/S1  Reflexes:  DTR’s= symmetric in LE  Special Tests:  Straight leg raising test is negative bilaterally ,Luis/RAMSEY maneuver is positive on left, Positive FADIR on left.  Negative Facet loading, Clonus negative

## 2020-11-03 NOTE — CONSULT NOTE ADULT - SUBJECTIVE AND OBJECTIVE BOX
Patient is a 59y old  Male who presents with a chief complaint of Hip pain, inability to ambulate (03 Nov 2020 08:32)      Reason For Consult: thyroid nodule    HPI:  60 yo male with PMH of HTN, hyperthyroidism, Anxiety, chronic back pain, fibromyalgia, GERD, HA, migraines, cervical radiculopathy presents to the ED transferred from [place] s/p mechanical fall on left hip with severe left groin pain. Patient states yesterday night around 9:30 PM he was taking a bath, and when he tried to get out of the bathtub he slipped on some water and fell hitting the front of his left hip on the side of the tub. He felt a pull in his groin after hitting the edge of his tub. He denies any chest pain, palpitations dizziness before the fall and denies LOC. He denies hitting his head. He was able to stand up without putting weight on his left leg. The pain continued to worsen and he called EMS who brought him to [place] ED. Patient underwent X-Ray of chest and pelvis and no fracture was found in the pelvis. Patient continued to have severe pain and CT pelvis was ordered, again not finding fracture. Patient was given pain medications and attempted to walk with PT but had severe pain. Pain is currently a 4/10 on the pain scale and radiates to his lower back and is stabbing in nature, relieved by oxycodone.     In the ED:  Vitals: Temp 98.4F | HR 78 | /76 | RR 16 | SpO2 98% RA  CBC WNL  BMP WNL  CXR: The heart is unremarkable. The lungs are clear. Bones are unremarkable for age.  X-ray pelvis: No acute fracture or dislocation. Joint spaces maintained.  CT pelvis: Osteopenia limits fracture detection. No acute displaced fracture or dislocation. If strong clinical concern persists, nuclear bone scan or MRI may be considered.  EKG: NSR, nonspecific T wave abnormality, QTc 443  S/p oxycodone     (01 Nov 2020 10:15)      PAST MEDICAL & SURGICAL HISTORY:  HTN (hypertension)    High cholesterol    Hyperthyroidism    Insomnia    GERD (gastroesophageal reflux disease)    Fibromyalgia    Herniated cervical disc    Anxiety    Radiculopathy  Rt. Arm    Headache, migraine    Back pain    History of Nissen fundoplication    S/P rhinoplasty    S/P hernia repair  B/L        FAMILY HISTORY:  FHx: stroke  Mother    FHx: myocardial infarction  Sibling, Parents, Maternal and Paternal Grandfathers          Social History:    MEDICATIONS  (STANDING):  enoxaparin Injectable 40 milliGRAM(s) SubCutaneous at bedtime  gabapentin 400 milliGRAM(s) Oral three times a day  metoprolol tartrate 50 milliGRAM(s) Oral two times a day  oxyCODONE    IR 10 milliGRAM(s) Oral every 6 hours  tamsulosin 0.4 milliGRAM(s) Oral at bedtime    MEDICATIONS  (PRN):  cyclobenzaprine 10 milliGRAM(s) Oral three times a day PRN Muscle Spasm  ondansetron    Tablet 4 milliGRAM(s) Oral every 8 hours PRN Nausea and/or Vomiting        T(C): 36.7 (11-03-20 @ 04:54), Max: 36.8 (11-02-20 @ 21:47)  HR: 90 (11-03-20 @ 04:54) (90 - 106)  BP: 115/74 (11-03-20 @ 04:54) (102/60 - 133/75)  RR: 18 (11-03-20 @ 04:54) (17 - 18)  SpO2: 95% (11-03-20 @ 04:54) (93% - 95%)  Wt(kg): --    PHYSICAL EXAM:  GENERAL: NAD, well-groomed, well-developed  HEAD:  Atraumatic, Normocephalic  NECK: Supple, No JVD, Normal thyroid  CHEST/LUNG: Clear to percussion bilaterally; No rales, rhonchi, wheezing, or rubs  HEART: Regular rate and rhythm; No murmurs, rubs, or gallops  ABDOMEN: Soft, Nontender, Nondistended; Bowel sounds present  EXTREMITIES:  2+ Peripheral Pulses, No clubbing, cyanosis, or edema  SKIN: No rashes or lesions    CAPILLARY BLOOD GLUCOSE                                12.9   5.17  )-----------( 176      ( 03 Nov 2020 05:43 )             39.4       CMP:  11-03 @ 05:43  SGPT --  Albumin --   Alk Phos --   Anion Gap 3   SGOT --   Total Bili --   BUN 16   Calcium Total 8.5   CO2 30   Chloride 109   Creatinine 0.75   eGFR if    eGFR if non AA 99   Glucose 102   Potassium 4.0   Protein --   Sodium 142      Thyroid Function Tests:      Diabetes Tests:       Radiology:

## 2020-11-03 NOTE — PROGRESS NOTE ADULT - ASSESSMENT
60 yo male with PMH of HTN, HLD, hyperthyroidism, Anxiety, chronic back pain, fibromyalgia, GERD, HA, migraines, cervical radiculopathy presents to the ED transferred from [place] s/p mechanical fall on left hip admitted with severe left groin pain and inability to ambulate.

## 2020-11-03 NOTE — PROGRESS NOTE ADULT - SUBJECTIVE AND OBJECTIVE BOX
Patient is a 59y old  Male who presents with a chief complaint of Hip pain, inability to ambulate (02 Nov 2020 13:45)    INTERVAL HPI/OVERNIGHT EVENTS: Patient seen and examined at bedside. He says that he was able to walk with PT a bit and feels much better than yesterday. He does not have much leg pain but he has some back pain today. Pt with hackett.    T(C): 36.7 (11-03-20 @ 04:54), Max: 36.8 (11-02-20 @ 21:47)  HR: 90 (11-03-20 @ 04:54) (90 - 106)  BP: 115/74 (11-03-20 @ 04:54) (102/60 - 133/75)  RR: 18 (11-03-20 @ 04:54) (17 - 18)  SpO2: 95% (11-03-20 @ 04:54) (93% - 95%)  Wt(kg): --    I&O's Summary    02 Nov 2020 07:01  -  03 Nov 2020 07:00  --------------------------------------------------------  IN: 0 mL / OUT: 350 mL / NET: -350 mL        LABS:                        12.9   5.17  )-----------( 176      ( 03 Nov 2020 05:43 )             39.4     11-03    142  |  109<H>  |  16  ----------------------------<  102<H>  4.0   |  30  |  0.75    Ca    8.5      03 Nov 2020 05:43        CAPILLARY BLOOD GLUCOSE          MEDICATIONS  (STANDING):  enoxaparin Injectable 40 milliGRAM(s) SubCutaneous at bedtime  gabapentin 400 milliGRAM(s) Oral three times a day  metoprolol tartrate 50 milliGRAM(s) Oral two times a day  oxyCODONE    IR 10 milliGRAM(s) Oral every 6 hours  tamsulosin 0.4 milliGRAM(s) Oral at bedtime    MEDICATIONS  (PRN):  cyclobenzaprine 10 milliGRAM(s) Oral three times a day PRN Muscle Spasm  ondansetron    Tablet 4 milliGRAM(s) Oral every 8 hours PRN Nausea and/or Vomiting      REVIEW OF SYSTEMS:  CONSTITUTIONAL: No fever, weight loss, or fatigue  EYES: No eye pain, visual disturbances, or discharge  ENMT: No difficulty hearing; No sinus or throat pain  RESPIRATORY: No cough; No shortness of breath  CARDIOVASCULAR: No chest pain, palpitations, dizziness, or leg swelling  GASTROINTESTINAL: No abdominal or epigastric pain. No nausea, vomiting, or hematemesis; No diarrhea or constipation  GENITOURINARY: [place]   NEUROLOGICAL: No headaches, memory loss  SKIN: No itching, burning, rashes, or lesions   MUSCULOSKELETAL: No joint pain or swelling; No muscle, back, or extremity pain    PHYSICAL EXAM:  GENERAL: NAD  HEAD: Atraumatic, Normocephalic  EYES: EOMI, PERRL, conjunctiva and sclera clear  ENMT: No tonsillar erythema, exudates, or enlargement; Moist mucous membranes  NECK: Supple, No JVD  NERVOUS SYSTEM: Alert & Oriented X3  CHEST/LUNG: Clear to auscultation bilaterally; No rales, rhonchi, wheezing, or rubs  HEART: Regular rate and rhythm; No murmurs, rubs, or gallops  ABDOMEN: Soft, Nontender, Nondistended; Bowel sounds present  EXTREMITIES: 2+ Peripheral Pulses, No clubbing, cyanosis, or edema  SKIN: Warm, dry, well-perfused    RADIOLOGY & ADDITIONAL TESTS:    Imaging Personally Reviewed:  [ ] YES  [ ] NO    Consultant(s) Notes Reviewed:  [ ] YES  [ ] NO    Care Discussed with Consultants/Other Providers [ ] YES  [ ] NO

## 2020-11-03 NOTE — DISCHARGE NOTE NURSING/CASE MANAGEMENT/SOCIAL WORK - PATIENT PORTAL LINK FT
You can access the FollowMyHealth Patient Portal offered by [facility] by registering at the following website: [email or web address]. By joining [facility]’s FollowMyHealth portal, you will also be able to view your health information using other applications (apps) compatible with our system.

## 2020-11-03 NOTE — CONSULT NOTE ADULT - ASSESSMENT
Assessment & Plan:     now with deficits in mobility and ADL's.  Patient is being medically managed and slowly improving however functionally remains weak, fatigued, and limited.  Has been tolerating bedside rehabilitation and making gains but remains fatigued and weak and functionally limited.      PT: Focus on gait training, AD training, static and dynamic balance training, functional activity tolerance, sensorimotor skills, increase body awareness, improve range of motion, strengthening, endurance training, neuromuscular training and control.    OT: Focus on activities of daily living, transfers and functional mobility training    PRECAUTIONS: General safety, Falls    Active Medical Problems that may affect rehabilitation course and have an impact on functional outcome:   #Pain  -Increase oxycodone to 15 mg q 6 hours PRN (this was patient's home medication as confirmed by  check Reference #: [id number])  -Continue gabapentin 400 mg TID    #Muscle spasm  -Continue flexeril 10 mg TID PRN    #Gait Instability: Patient with imbalance, instability, reduced strength in lower extremities. Continue with full PT/OT program with focus as above.  #Physical Deconditioning: Patient with reduced exercise tolerance in setting of medical comorbidities. Continue with full PT/OT program with focus on improving exercise tolerance and endurance.    #Muscle Weakness: In setting of immobility during acute hospital stay. Focus on range of motion exercises, strengthening, bed mobility, and transfer training. Continue with PT/OT.    #ADL and Mobility dysfunction: Secondary to above, continue PT/OT Evaluate for assistive devices. Progress to stair management and community re-entry skills.    #Pain- Tylenol 650 mg q 6 hours PRN for mild pain (1-3)  Oxycodone 5 mg q 6 hours PRN for moderate pain (4-6)  Oxycodone 10 mg q 6 hours PRN for severe pain (7-10)    Prophylaxis:   Q2H skin checks, turning and positioning to prevent pressure injury  DVT prophylaxis per primary team  Bowel regimen: Colace 100 mg TID.    Senna 17.2 mg qhs.    Anticipated Discharge Destination:   Subacute Rehabilitation-  Patient will benefit from a less intensive intervention of multiple therapy disciplines (Physical Therapy [PT], Occupational Therapy [OT], Speech-Language Pathology [SLP]).        Jaime Alejandro D.O.  Board Certified Physical Medicine & Rehabilitation and Interventional Pain Physician   Assessment & Plan: 60 yo male with PMH of HTN, HLD, hyperthyroidism, Anxiety, chronic back pain, fibromyalgia, GERD, HA, migraines, cervical radiculopathy presents to the ED transferred from [place] s/p mechanical fall on left hip admitted with severe left groin pain and inability to ambulat now with deficits in mobility and ADL's.  Patient is being medically managed and slowly improving.  He has been tolerating bedside rehabilitation and making gains but remains fatigued and weak and functionally limited.      PT: Focus on gait training, AD training, static and dynamic balance training, functional activity tolerance, sensorimotor skills, increase body awareness, improve range of motion, strengthening, endurance training, neuromuscular training and control.    OT: Focus on activities of daily living, transfers and functional mobility training    PRECAUTIONS: General safety, Falls    Active Medical Problems that may affect rehabilitation course and have an impact on functional outcome:   #Pain  -Increase oxycodone to 15 mg q 6 hours PRN (this was patient's home medication as confirmed by  check Reference #: [id number])  -Continue gabapentin 400 mg TID  -Lidocaine patch q 12 hours on and 12 hours off    #Muscle spasm  -Continue flexeril 10 mg TID PRN    #Gait Instability: Patient with imbalance, instability, reduced strength in lower extremities. Continue with full PT/OT program with focus as above.  #ADL and Mobility dysfunction: Secondary to above, continue PT/OT Evaluate for assistive devices. Progress to stair management and community re-entry skills.    Prophylaxis:   Q2H skin checks, turning and positioning to prevent pressure injury  DVT prophylaxis per primary team  Bowel regimen: Colace 100 mg TID.    Senna 17.2 mg qhs.    Anticipated Discharge Destination:   Subacute Rehabilitation-  Patient will benefit from a less intensive intervention of multiple therapy disciplines (Physical Therapy [PT], Occupational Therapy [OT], Speech-Language Pathology [SLP]).      Jaime Alejandro D.O.  Board Certified Physical Medicine & Rehabilitation and Interventional Pain Physician

## 2020-11-04 VITALS
RESPIRATION RATE: 18 BRPM | TEMPERATURE: 98 F | HEART RATE: 83 BPM | OXYGEN SATURATION: 95 % | DIASTOLIC BLOOD PRESSURE: 87 MMHG | SYSTOLIC BLOOD PRESSURE: 120 MMHG

## 2020-11-04 LAB
ANION GAP SERPL CALC-SCNC: 6 MMOL/L — SIGNIFICANT CHANGE UP (ref 5–17)
BUN SERPL-MCNC: 13 MG/DL — SIGNIFICANT CHANGE UP (ref 7–23)
CALCIUM SERPL-MCNC: 8.5 MG/DL — SIGNIFICANT CHANGE UP (ref 8.5–10.1)
CHLORIDE SERPL-SCNC: 106 MMOL/L — SIGNIFICANT CHANGE UP (ref 96–108)
CO2 SERPL-SCNC: 29 MMOL/L — SIGNIFICANT CHANGE UP (ref 22–31)
CREAT SERPL-MCNC: 0.87 MG/DL — SIGNIFICANT CHANGE UP (ref 0.5–1.3)
GLUCOSE SERPL-MCNC: 88 MG/DL — SIGNIFICANT CHANGE UP (ref 70–99)
HCT VFR BLD CALC: 39.8 % — SIGNIFICANT CHANGE UP (ref 39–50)
HGB BLD-MCNC: 13.3 G/DL — SIGNIFICANT CHANGE UP (ref 13–17)
MCHC RBC-ENTMCNC: 30.6 PG — SIGNIFICANT CHANGE UP (ref 27–34)
MCHC RBC-ENTMCNC: 33.4 GM/DL — SIGNIFICANT CHANGE UP (ref 32–36)
MCV RBC AUTO: 91.5 FL — SIGNIFICANT CHANGE UP (ref 80–100)
NRBC # BLD: 0 /100 WBCS — SIGNIFICANT CHANGE UP (ref 0–0)
PLATELET # BLD AUTO: 179 K/UL — SIGNIFICANT CHANGE UP (ref 150–400)
POTASSIUM SERPL-MCNC: 3.9 MMOL/L — SIGNIFICANT CHANGE UP (ref 3.5–5.3)
POTASSIUM SERPL-SCNC: 3.9 MMOL/L — SIGNIFICANT CHANGE UP (ref 3.5–5.3)
RBC # BLD: 4.35 M/UL — SIGNIFICANT CHANGE UP (ref 4.2–5.8)
RBC # FLD: 11.9 % — SIGNIFICANT CHANGE UP (ref 10.3–14.5)
SODIUM SERPL-SCNC: 141 MMOL/L — SIGNIFICANT CHANGE UP (ref 135–145)
T3FREE SERPL-MCNC: 2.27 PG/ML — SIGNIFICANT CHANGE UP (ref 1.8–4.6)
T4 FREE SERPL-MCNC: 0.8 NG/DL — LOW (ref 0.9–1.8)
TSH SERPL-MCNC: 0.98 UIU/ML — SIGNIFICANT CHANGE UP (ref 0.36–3.74)
WBC # BLD: 4.98 K/UL — SIGNIFICANT CHANGE UP (ref 3.8–10.5)
WBC # FLD AUTO: 4.98 K/UL — SIGNIFICANT CHANGE UP (ref 3.8–10.5)

## 2020-11-04 PROCEDURE — 86769 SARS-COV-2 COVID-19 ANTIBODY: CPT

## 2020-11-04 PROCEDURE — 84443 ASSAY THYROID STIM HORMONE: CPT

## 2020-11-04 PROCEDURE — 97116 GAIT TRAINING THERAPY: CPT

## 2020-11-04 PROCEDURE — 36415 COLL VENOUS BLD VENIPUNCTURE: CPT

## 2020-11-04 PROCEDURE — 84481 FREE ASSAY (FT-3): CPT

## 2020-11-04 PROCEDURE — 80048 BASIC METABOLIC PNL TOTAL CA: CPT

## 2020-11-04 PROCEDURE — U0003: CPT

## 2020-11-04 PROCEDURE — 93005 ELECTROCARDIOGRAM TRACING: CPT

## 2020-11-04 PROCEDURE — 97161 PT EVAL LOW COMPLEX 20 MIN: CPT

## 2020-11-04 PROCEDURE — 84439 ASSAY OF FREE THYROXINE: CPT

## 2020-11-04 PROCEDURE — 85027 COMPLETE CBC AUTOMATED: CPT

## 2020-11-04 PROCEDURE — 99285 EMERGENCY DEPT VISIT HI MDM: CPT

## 2020-11-04 PROCEDURE — 85025 COMPLETE CBC W/AUTO DIFF WBC: CPT

## 2020-11-04 PROCEDURE — 71045 X-RAY EXAM CHEST 1 VIEW: CPT

## 2020-11-04 PROCEDURE — 97530 THERAPEUTIC ACTIVITIES: CPT

## 2020-11-04 RX ADMIN — GABAPENTIN 400 MILLIGRAM(S): 400 CAPSULE ORAL at 05:31

## 2020-11-04 RX ADMIN — OXYCODONE HYDROCHLORIDE 10 MILLIGRAM(S): 5 TABLET ORAL at 11:15

## 2020-11-04 RX ADMIN — OXYCODONE HYDROCHLORIDE 10 MILLIGRAM(S): 5 TABLET ORAL at 12:09

## 2020-11-04 RX ADMIN — OXYCODONE HYDROCHLORIDE 10 MILLIGRAM(S): 5 TABLET ORAL at 00:39

## 2020-11-04 RX ADMIN — Medication 50 MILLIGRAM(S): at 05:31

## 2020-11-04 RX ADMIN — OXYCODONE HYDROCHLORIDE 10 MILLIGRAM(S): 5 TABLET ORAL at 00:09

## 2020-11-04 RX ADMIN — LIDOCAINE 2 PATCH: 4 CREAM TOPICAL at 01:40

## 2020-11-04 RX ADMIN — OXYCODONE HYDROCHLORIDE 10 MILLIGRAM(S): 5 TABLET ORAL at 05:31

## 2020-11-04 RX ADMIN — OXYCODONE HYDROCHLORIDE 10 MILLIGRAM(S): 5 TABLET ORAL at 06:08

## 2020-11-04 NOTE — PROGRESS NOTE ADULT - SUBJECTIVE AND OBJECTIVE BOX
Patient is a 59y old  Male who presents with a chief complaint of Hip pain, inability to ambulate (04 Nov 2020 10:57)        INTERVAL HPI/OVERNIGHT EVENTS:    MEDICATIONS  (STANDING):  enoxaparin Injectable 40 milliGRAM(s) SubCutaneous at bedtime  gabapentin 400 milliGRAM(s) Oral three times a day  lidocaine   Patch 2 Patch Transdermal daily  metoprolol tartrate 50 milliGRAM(s) Oral two times a day  oxyCODONE    IR 10 milliGRAM(s) Oral every 6 hours  tamsulosin 0.4 milliGRAM(s) Oral at bedtime    MEDICATIONS  (PRN):  acetaminophen   Tablet .. 650 milliGRAM(s) Oral every 6 hours PRN Temp greater or equal to 38C (100.4F), Mild Pain (1 - 3)  cyclobenzaprine 10 milliGRAM(s) Oral three times a day PRN Muscle Spasm  ondansetron    Tablet 4 milliGRAM(s) Oral every 8 hours PRN Nausea and/or Vomiting      Allergies    clams (Stomach Upset)  prednisoLONE (Unknown)  shellfish (Hives)    Intolerances          Vital Signs Last 24 Hrs  T(C): 36.7 (04 Nov 2020 12:19), Max: 36.7 (03 Nov 2020 20:58)  T(F): 98.1 (04 Nov 2020 12:19), Max: 98.1 (04 Nov 2020 12:19)  HR: 83 (04 Nov 2020 12:19) (82 - 113)  BP: 120/87 (04 Nov 2020 12:19) (101/65 - 160/79)  BP(mean): --  RR: 18 (04 Nov 2020 12:19) (18 - 18)  SpO2: 95% (04 Nov 2020 12:19) (95% - 98%)      Respiratory: CTA B/L  CV: RRR, S1S2, no murmurs, rubs or gallops  Abdominal: Soft, NT, ND +BS, Last BM  Extremities: No edema, + peripheral pulses    LABS:                        13.3   4.98  )-----------( 179      ( 04 Nov 2020 05:46 )             39.8     11-04    141  |  106  |  13  ----------------------------<  88  3.9   |  29  |  0.87    Ca    8.5      04 Nov 2020 05:46      CAPILLARY BLOOD GLUCOSE              RADIOLOGY & ADDITIONAL TESTS:

## 2020-11-04 NOTE — PROGRESS NOTE ADULT - PROBLEM/PLAN-1
DISPLAY PLAN FREE TEXT
CONSTIPATION/CRAMPS/PAIN

## 2020-11-04 NOTE — PROGRESS NOTE ADULT - PROBLEM SELECTOR PLAN 1
Patient presenting with L groin/hip pain s/p mechanical fall after slipping on water while getting out of tub  - Xray: negative for acute fracture, CT pelvis: osteopenia, no fracture noted  - Pt working with PT yesterday  - PT rec: home care PT with rolling walker  - Pain regimen: takes oxycodone 15mg q6 hours, will continue standing with lidocaine patch

## 2020-11-04 NOTE — PROGRESS NOTE ADULT - PROBLEM SELECTOR PROBLEM 1
Hyperthyroidism
Inability to ambulate due to left hip

## 2020-11-04 NOTE — PROGRESS NOTE ADULT - ASSESSMENT
60 yo male with PMH of HTN, HLD, hyperthyroidism, Anxiety, chronic back pain, fibromyalgia, GERD, HA, migraines, cervical radiculopathy presents to the ED transferred from [place] s/p mechanical fall on left hip admitted with severe left groin pain and inability to ambulate.

## 2020-11-04 NOTE — PROGRESS NOTE ADULT - SUBJECTIVE AND OBJECTIVE BOX
Patient is a 59y old  Male who presents with a chief complaint of Hip pain, inability to ambulate (03 Nov 2020 19:32)    INTERVAL HPI/OVERNIGHT EVENTS: Pt seen and examined at bedside. Pt has come left sided pain over his ribs.     T(C): 36.6 (11-04-20 @ 05:21), Max: 36.8 (11-03-20 @ 13:19)  HR: 83 (11-04-20 @ 10:10) (82 - 113)  BP: 101/65 (11-04-20 @ 10:10) (101/65 - 160/79)  RR: 18 (11-04-20 @ 05:21) (17 - 18)  SpO2: 96% (11-04-20 @ 10:10) (95% - 98%)  Wt(kg): --    I&O's Summary    03 Nov 2020 07:01  -  04 Nov 2020 07:00  --------------------------------------------------------  IN: 740 mL / OUT: 850 mL / NET: -110 mL        LABS:                        13.3   4.98  )-----------( 179      ( 04 Nov 2020 05:46 )             39.8     11-04    141  |  106  |  13  ----------------------------<  88  3.9   |  29  |  0.87    Ca    8.5      04 Nov 2020 05:46        CAPILLARY BLOOD GLUCOSE                 MEDICATIONS  (STANDING):  enoxaparin Injectable 40 milliGRAM(s) SubCutaneous at bedtime  gabapentin 400 milliGRAM(s) Oral three times a day  lidocaine   Patch 2 Patch Transdermal daily  metoprolol tartrate 50 milliGRAM(s) Oral two times a day  oxyCODONE    IR 10 milliGRAM(s) Oral every 6 hours  tamsulosin 0.4 milliGRAM(s) Oral at bedtime    MEDICATIONS  (PRN):  acetaminophen   Tablet .. 650 milliGRAM(s) Oral every 6 hours PRN Temp greater or equal to 38C (100.4F), Mild Pain (1 - 3)  cyclobenzaprine 10 milliGRAM(s) Oral three times a day PRN Muscle Spasm  ondansetron    Tablet 4 milliGRAM(s) Oral every 8 hours PRN Nausea and/or Vomiting      REVIEW OF SYSTEMS:  CONSTITUTIONAL: No fever, weight loss, or fatigue  EYES: No eye pain, visual disturbances, or discharge  ENMT: No difficulty hearing; No sinus or throat pain  RESPIRATORY: No cough; No shortness of breath  CARDIOVASCULAR: No chest pain, palpitations, dizziness, or leg swelling  GASTROINTESTINAL: No abdominal or epigastric pain. No nausea, vomiting, or hematemesis; No diarrhea or constipation  GENITOURINARY: [place]   NEUROLOGICAL: No headaches, memory loss  SKIN: No itching, burning, rashes, or lesions   MUSCULOSKELETAL: + pain on his left side. No joint pain or swelling; No back or extremity pain.    PHYSICAL EXAM:  GENERAL: NAD  HEAD: Atraumatic, Normocephalic  EYES: EOMI, PERRL, conjunctiva and sclera clear  ENMT: No tonsillar erythema, exudates, or enlargement; Moist mucous membranes  NECK: Supple, No JVD  NERVOUS SYSTEM: Alert & Oriented X3  CHEST/LUNG: Clear to auscultation bilaterally; No rales, rhonchi, wheezing, or rubs  HEART: Regular rate and rhythm; No murmurs, rubs, or gallops  ABDOMEN: Soft, Nontender, Nondistended; Bowel sounds present  EXTREMITIES: 2+ Peripheral Pulses, No clubbing, cyanosis, or edema  SKIN: Warm, dry, well-perfused      RADIOLOGY & ADDITIONAL TESTS:    Imaging Personally Reviewed:  [ ] YES  [ ] NO    Consultant(s) Notes Reviewed:  [ ] YES  [ ] NO    Care Discussed with Consultants/Other Providers [ ] YES  [ ] NO

## 2020-11-04 NOTE — PROGRESS NOTE ADULT - PROBLEM SELECTOR PLAN 2
non-emergent thyroid us in outpt setting
Pt unable to void on 11/1, >900cc on bladder scan  - Pt now with hackett
Pt unable to void on 11/1, >900cc on bladder scan  - Pt with hackett  - TOV today
Pt unable to void on 11/1, >900cc on bladder scan  - Pt with hackett  - TOV today

## 2023-02-02 NOTE — ED ADULT TRIAGE NOTE - TEMPERATURE IN FAHRENHEIT (DEGREES F)
Group Topic:  Group Psychotherapy    Date: 2/2/2023  Start Time: 10:15 AM  End Time: 11:15 AM  Facilitators: Meek Hurst LCSW    Focus: The focus of [facility] group was on healthy separation and good by as evidenced by the honest and open expression of feelings.   Number in attendance: 10    Patient was an active participant in group and shared their experiences with patients leaving the program.  Pt shared what they appreciated about the person leaving and what they learned from them in group. Therapist and group offered them support and feedback. They were engaged and responsive to the session Services provided via remote technology.  60 minutes spent with the pt using this technology.        Method: Group  Attendance: Present  Participation: Moderate  Patient Response: Appropriate feedback and Attentive  Mood: Anxious and Depressed  Affect: Type: Anxious and Depressed   Range: Blunted/flat   Congruency: Congruent   Stability: Stable  Behavior/Socialization: Cooperative  Thought Process: Focused  Task Performance: Follows directions  Additional Information:  Psychosocial Stressors: Grief/Loss  Symptom Notations: anxious, depressed, engaged.   Patient Evaluation: Encouragement - needs prompts       98.1

## 2023-02-21 NOTE — PROGRESS NOTE ADULT - PROBLEM SELECTOR PLAN 5
Chronic  - s/p Nissen fundoplication  - Takes Zofran for nausea, will continue inpatient  - Per patient does not use any OTC acid reducers Modifiable risk factors: lack of connection to care; ongoing SI; ongoing substance use  Unmodifiable risk factors: history of psychiatric hospitalization; history of mood and substance use disorders; past SA  Protective factors: domiciled status; supportive girlfriend; help-seeking

## 2023-10-13 NOTE — PROVIDER CONTACT NOTE (OTHER) - DATE AND TIME:
Continued Stay SW/CM Assessment/Plan of Care Note   Progress note:  CM discussed patient in huddle, patient was active with [facility] for RN PT and Ostomy care. PS attending for HH resumption, pending reply. CM/SW will continue to follow for DCP.    See SW/CM flowsheets for other objective data.    Disposition Recommendations:  Preliminary discharge destination:    SW/CM recommendation for discharge:      Discharge Plan/Needs:     Continued Care and Services - Admitted Since 10/11/2023    Coordination has not been started for this encounter.       Prior To Hospitalization:    Living Situation: Spouse and residing at House    .  Support Systems: Children, Family members, Spouse   Home Devices/Equipment: Blood pressure monitor            Mobility Assist Devices: None   Type of Service Prior to Hospitalization: Home care, Nurse (specify) (2 times a week)               Patient/Family discharge goal (s):        Resources provided:           Therapy Recommendations for Discharge:   PT:      Last Filed Values       Value Time User    PT Discharge Needs  therapy 1-3 times per week 10/12/2023  1:49 PM Elda Patel, PT        OT:       Last Filed Values       Value Time User    OT Discharge Needs  does not require ongoing therapy 10/12/2023  2:37 PM O&#39;Tyra Vega, OTR/L        SLP:    Last Filed Values     None          Mobility Equipment Recommended for Discharge: May need RW      Barriers to Discharge  Identified Barriers to Discharge/Transition Planning:                   01-Nov-2020 21:35

## 2023-11-10 NOTE — BEHAVIORAL HEALTH ASSESSMENT NOTE - HPI (INCLUDE ILLNESS QUALITY, SEVERITY, DURATION, TIMING, CONTEXT, MODIFYING FACTORS, ASSOCIATED SIGNS AND SYMPTOMS)
Patient seen, evaluated and chart reviewed. 59 yo M with PMH of Cervical Disc Disease, Fibromyalgia, ?Arrhythmia, Hypothyroidism, Anxiety, Migraines, GERD, Insomnia, presented to the ED after being found moaning on the bathroom floor by his brother. History confirmed with patient, who is not the best historian, and chart review. Call placed to patient's brother, but could not be reached. Patient was previously seen sleeping in a chair at home around 22:00 on night of admission. Per his brother, he had appeared at his baseline mental status earlier in the day. His brother also stated that he had not slept for the past 3 days despite taking Benadryl, which he uses chronically for insomnia; last dose taken on evening of admission. Of note, patient has chronic pain due to his Cervical Disc Disease, Fibromyalgia, and takes Oxycodone 15mg PO q6h prn pain, and reported being compliant with this dose. Per chart review, he appeared extremely lethargic and was noted to be hypoventilating upon presentation to the ED. He was emergently given Narcan 0.4mg IV x1, after which his alertness improved, although his brother stated that he was not quite at his baseline mental status. According to chart review, patient returned to his baseline mental status at approximately 02:30. Currently, he admitted to chronic pain and fatigue with no new pain, as well as a chronic itchy rash on his face, UE bilaterally. He also stated that he had no recollection of the events leading up to his arrival in [place] ED. No other acute complaints at this time.  Patient has a long history of mood symptoms with opioid dependence. His parents recently passed away and the patient is currently living intermittently alone and with his brother. He likely overdosed accidentally, he denies symptoms of psychosis, chris or depression. No

## 2024-10-28 NOTE — ED ADULT NURSE NOTE - DISTAL EXTREMITY COLOR
Caller: Linda Taylor    Relationship: Self    Best call back number: [phone]    Requested Prescriptions:   Requested Prescriptions     Pending Prescriptions Disp Refills    amLODIPine (NORVASC) 2.5 MG tablet 30 tablet 0     Sig: Take 1 tablet by mouth Daily.        Pharmacy where request should be sent:      Last office visit with prescribing clinician: 7/30/2024   Last telemedicine visit with prescribing clinician: Visit date not found   Next office visit with prescribing clinician: Visit date not found     Additional details provided by patient: 90 DAY SUPPLY PLEASE    Does the patient have less than a 3 day supply:  [x] Yes  [] No    Would you like a call back once the refill request has been completed: [] Yes [x] No    If the office needs to give you a call back, can they leave a voicemail: [] Yes [x] No    Umer Urbina Rep   10/28/24 08:48 EDT         
Patient notified, RX refused due to no recent follow-up. Patient to contact PCP.  
color consistent with ethnicity/race

## 2025-02-04 NOTE — ED ADULT TRIAGE NOTE - AS O2 DELIVERY
Anemia Management Note - Reminder     Follow-up with anemia management service:    Miguel Angel LVANALISA on 2/3/25 asking about the PA for his Retacrit.     PA is still pending per updated note from this morning (2/4/25).  Called Miguel Angel with an update.  He was busy and stated he will call back when he is available.         Latest Ref Rng & Units 5/3/2024 6/19/2024 7/15/2024 8/5/2024 8/29/2024 9/10/2024 1/16/2025   Anemia   Hemoglobin 13.3 - 17.7 g/dL 9.7  8.7  9.8  10.1  9.4  9.5  8.3    TSAT 15 - 46 % 28  34  13   33   20    Ferritin 31 - 409 ng/mL 297  317  313   392   592            Follow-up call date: 2/6/25    Janna Louis RN   Anemia Services  [facility]  [email or web address]   Office : [phone]  Fax: [phone]      
Miguel Angel called back. Relayed message that PA is still pending, and we'll call him when we have a determination. He voiced understanding and had no questions.    Carrie Leopold, RN BSN  [facility]  [email or web address]  Office: [phone]  Fax [phone]          
supplemental O2